# Patient Record
Sex: MALE | Employment: FULL TIME | URBAN - METROPOLITAN AREA
[De-identification: names, ages, dates, MRNs, and addresses within clinical notes are randomized per-mention and may not be internally consistent; named-entity substitution may affect disease eponyms.]

---

## 2018-07-22 ENCOUNTER — HOSPITAL ENCOUNTER (EMERGENCY)
Age: 43
Discharge: HOME | End: 2018-07-22
Attending: EMERGENCY MEDICINE
Payer: SELF-PAY

## 2018-07-22 VITALS
HEIGHT: 75 IN | HEART RATE: 90 BPM | WEIGHT: 290 LBS | RESPIRATION RATE: 18 BRPM | DIASTOLIC BLOOD PRESSURE: 90 MMHG | SYSTOLIC BLOOD PRESSURE: 144 MMHG | BODY MASS INDEX: 36.06 KG/M2 | TEMPERATURE: 98.1 F | OXYGEN SATURATION: 98 %

## 2018-07-22 DIAGNOSIS — K52.9 GASTROENTERITIS: Primary | ICD-10-CM

## 2018-07-22 LAB
ALBUMIN SERPL-MCNC: 3.9 G/DL (ref 3.5–5)
ALBUMIN/GLOB SERPL: 0.9 {RATIO} (ref 1.2–3.5)
ALP SERPL-CCNC: 120 U/L (ref 50–136)
ALT SERPL-CCNC: 28 U/L (ref 12–65)
ANION GAP SERPL CALC-SCNC: 8 MMOL/L (ref 7–16)
AST SERPL-CCNC: 20 U/L (ref 15–37)
BASOPHILS # BLD: 0 K/UL (ref 0–0.2)
BASOPHILS NFR BLD: 0 % (ref 0–2)
BILIRUB SERPL-MCNC: 1.1 MG/DL (ref 0.2–1.1)
BUN SERPL-MCNC: 12 MG/DL (ref 6–23)
CALCIUM SERPL-MCNC: 9.6 MG/DL (ref 8.3–10.4)
CHLORIDE SERPL-SCNC: 103 MMOL/L (ref 98–107)
CO2 SERPL-SCNC: 27 MMOL/L (ref 21–32)
CREAT SERPL-MCNC: 1.18 MG/DL (ref 0.8–1.5)
DIFFERENTIAL METHOD BLD: ABNORMAL
EOSINOPHIL # BLD: 0.1 K/UL (ref 0–0.8)
EOSINOPHIL NFR BLD: 1 % (ref 0.5–7.8)
ERYTHROCYTE [DISTWIDTH] IN BLOOD BY AUTOMATED COUNT: 12.8 % (ref 11.9–14.6)
GLOBULIN SER CALC-MCNC: 4.3 G/DL (ref 2.3–3.5)
GLUCOSE SERPL-MCNC: 184 MG/DL (ref 65–100)
HCT VFR BLD AUTO: 43.2 % (ref 41.1–50.3)
HGB BLD-MCNC: 14.7 G/DL (ref 13.6–17.2)
IMM GRANULOCYTES # BLD: 0 K/UL (ref 0–0.5)
IMM GRANULOCYTES NFR BLD AUTO: 0 % (ref 0–5)
LIPASE SERPL-CCNC: 69 U/L (ref 73–393)
LYMPHOCYTES # BLD: 4.8 K/UL (ref 0.5–4.6)
LYMPHOCYTES NFR BLD: 61 % (ref 13–44)
MCH RBC QN AUTO: 27.9 PG (ref 26.1–32.9)
MCHC RBC AUTO-ENTMCNC: 34 G/DL (ref 31.4–35)
MCV RBC AUTO: 82.1 FL (ref 79.6–97.8)
MONOCYTES # BLD: 0.5 K/UL (ref 0.1–1.3)
MONOCYTES NFR BLD: 6 % (ref 4–12)
NEUTS SEG # BLD: 2.5 K/UL (ref 1.7–8.2)
NEUTS SEG NFR BLD: 32 % (ref 43–78)
PLATELET # BLD AUTO: 240 K/UL (ref 150–450)
PMV BLD AUTO: 10.9 FL (ref 10.8–14.1)
POTASSIUM SERPL-SCNC: 3.7 MMOL/L (ref 3.5–5.1)
PROT SERPL-MCNC: 8.2 G/DL (ref 6.3–8.2)
RBC # BLD AUTO: 5.26 M/UL (ref 4.23–5.67)
SODIUM SERPL-SCNC: 138 MMOL/L (ref 136–145)
WBC # BLD AUTO: 7.8 K/UL (ref 4.3–11.1)

## 2018-07-22 PROCEDURE — 74011250636 HC RX REV CODE- 250/636: Performed by: EMERGENCY MEDICINE

## 2018-07-22 PROCEDURE — 83690 ASSAY OF LIPASE: CPT | Performed by: EMERGENCY MEDICINE

## 2018-07-22 PROCEDURE — 81003 URINALYSIS AUTO W/O SCOPE: CPT | Performed by: EMERGENCY MEDICINE

## 2018-07-22 PROCEDURE — 96361 HYDRATE IV INFUSION ADD-ON: CPT | Performed by: EMERGENCY MEDICINE

## 2018-07-22 PROCEDURE — 99284 EMERGENCY DEPT VISIT MOD MDM: CPT | Performed by: EMERGENCY MEDICINE

## 2018-07-22 PROCEDURE — 96374 THER/PROPH/DIAG INJ IV PUSH: CPT | Performed by: EMERGENCY MEDICINE

## 2018-07-22 PROCEDURE — 85025 COMPLETE CBC W/AUTO DIFF WBC: CPT | Performed by: EMERGENCY MEDICINE

## 2018-07-22 PROCEDURE — 80053 COMPREHEN METABOLIC PANEL: CPT | Performed by: EMERGENCY MEDICINE

## 2018-07-22 RX ORDER — ONDANSETRON 4 MG/1
4 TABLET, ORALLY DISINTEGRATING ORAL
Qty: 11 TAB | Refills: 1 | Status: SHIPPED | OUTPATIENT
Start: 2018-07-22 | End: 2019-09-20

## 2018-07-22 RX ORDER — ONDANSETRON 2 MG/ML
4 INJECTION INTRAMUSCULAR; INTRAVENOUS
Status: COMPLETED | OUTPATIENT
Start: 2018-07-22 | End: 2018-07-22

## 2018-07-22 RX ADMIN — SODIUM CHLORIDE 1000 ML: 900 INJECTION, SOLUTION INTRAVENOUS at 14:33

## 2018-07-22 RX ADMIN — ONDANSETRON 4 MG: 2 INJECTION, SOLUTION INTRAMUSCULAR; INTRAVENOUS at 14:33

## 2018-07-22 NOTE — ED PROVIDER NOTES
HPI Comments: Patient is a 43-year-old male who states he ate some food from a new restaurant 2 days ago. Then yesterday he started with some crampy abdominal pain. It then progressed to nausea, vomiting, and diarrhea. Symptoms were worse today and he felt weak. He denies any fever. He denies any bleeding. Patient is a 40 y.o. male presenting with abdominal pain. The history is provided by the patient. Abdominal Pain    This is a new problem. The current episode started yesterday. The problem occurs constantly. The problem has not changed since onset. The pain is associated with vomiting. The pain is located in the generalized abdominal region. The quality of the pain is cramping. The pain is mild. Associated symptoms include diarrhea, nausea and vomiting. Pertinent negatives include no fever, no melena, no dysuria and no trauma. Nothing worsens the pain. The pain is relieved by nothing. Past workup includes CT scan. Past workup includes no ultrasound. His past medical history does not include gallstones, Crohn's disease, irritable bowel syndrome or pancreatitis. The patient's surgical history includes appendectomy. History reviewed. No pertinent past medical history. History reviewed. No pertinent surgical history. History reviewed. No pertinent family history. Social History     Social History    Marital status:      Spouse name: N/A    Number of children: N/A    Years of education: N/A     Occupational History    Not on file. Social History Main Topics    Smoking status: Not on file    Smokeless tobacco: Not on file    Alcohol use Not on file    Drug use: Not on file    Sexual activity: Not on file     Other Topics Concern    Not on file     Social History Narrative    No narrative on file         ALLERGIES: Tamiflu [oseltamivir] and Tramadol    Review of Systems   Constitutional: Negative for fever. HENT: Negative. Eyes: Negative. Respiratory: Negative. Cardiovascular: Negative. Gastrointestinal: Positive for abdominal pain, diarrhea, nausea and vomiting. Negative for melena. Endocrine: Negative. Genitourinary: Negative for dysuria. Musculoskeletal: Negative. Skin: Negative. Neurological: Negative. Vitals:    07/22/18 1336   BP: (!) 154/104   Pulse: 97   Resp: 18   Temp: 98.1 °F (36.7 °C)   SpO2: 100%   Weight: 131.5 kg (290 lb)   Height: 6' 3\" (1.905 m)            Physical Exam   Constitutional: He appears well-developed and well-nourished. HENT:   Head: Normocephalic and atraumatic. Cardiovascular: Normal rate and regular rhythm. Pulmonary/Chest: Effort normal and breath sounds normal.   Abdominal: Soft. There is no tenderness. There is no rebound and no guarding. Skin: Skin is warm and dry. No rash noted. Nursing note and vitals reviewed. MDM  Number of Diagnoses or Management Options  Diagnosis management comments: Blood work is unremarkable. He is feeling better after the meds and fluids. This likely represents viral gastroenteritis with some possible food poisoning. Amount and/or Complexity of Data Reviewed  Clinical lab tests: ordered and reviewed  Independent visualization of images, tracings, or specimens: yes    Risk of Complications, Morbidity, and/or Mortality  Presenting problems: low  Diagnostic procedures: low  Management options: low    Patient Progress  Patient progress: improved        ED Course     Voice dictation software was used during the making of this note. This software is not perfect and grammatical and other typographical errors may be present. This note has been proofread, but may still contain errors.   Maxine Macias MD; 7/22/2018 @2:56 PM   ===================================================================      Procedures

## 2018-07-22 NOTE — ED NOTES
I have reviewed discharge instructions with the patient. The patient verbalized understanding. Patient left ED via Discharge Method: ambulatory to Home with (self). Opportunity for questions and clarification provided. Patient given 1 scripts. To continue your aftercare when you leave the hospital, you may receive an automated call from our care team to check in on how you are doing. This is a free service and part of our promise to provide the best care and service to meet your aftercare needs.  If you have questions, or wish to unsubscribe from this service please call [phone]. Thank you for Choosing our [facility] Emergency Department.

## 2018-07-22 NOTE — DISCHARGE INSTRUCTIONS
Gastroenteritis: Care Instructions  Your Care Instructions    Gastroenteritis is an illness that may cause nausea, vomiting, and diarrhea. It is sometimes called \"stomach flu. \" It can be caused by bacteria or a virus. You will probably begin to feel better in 1 to 2 days. In the meantime, get plenty of rest and make sure you do not become dehydrated. Dehydration occurs when your body loses too much fluid. Follow-up care is a key part of your treatment and safety. Be sure to make and go to all appointments, and call your doctor if you are having problems. It's also a good idea to know your test results and keep a list of the medicines you take. How can you care for yourself at home? · If your doctor prescribed antibiotics, take them as directed. Do not stop taking them just because you feel better. You need to take the full course of antibiotics. · Drink plenty of fluids to prevent dehydration, enough so that your urine is light yellow or clear like water. Choose water and other caffeine-free clear liquids until you feel better. If you have kidney, heart, or liver disease and have to limit fluids, talk with your doctor before you increase your fluid intake. · Drink fluids slowly, in frequent, small amounts, because drinking too much too fast can cause vomiting. · Begin eating mild foods, such as dry toast, yogurt, applesauce, bananas, and rice. Avoid spicy, hot, or high-fat foods, and do not drink alcohol or caffeine for a day or two. Do not drink milk or eat ice cream until you are feeling better. How to prevent gastroenteritis  · Keep hot foods hot and cold foods cold. · Do not eat meats, dressings, salads, or other foods that have been kept at room temperature for more than 2 hours. · Use a thermometer to check your refrigerator. It should be between 34°F and 40°F.  · Defrost meats in the refrigerator or microwave, not on the kitchen counter. · Keep your hands and your kitchen clean.  Wash your hands, cutting boards, and countertops with hot soapy water frequently. · Cook meat until it is well done. · Do not eat raw eggs or uncooked sauces made with raw eggs. · Do not take chances. If food looks or tastes spoiled, throw it out. When should you call for help? Call 911 anytime you think you may need emergency care. For example, call if:    · You vomit blood or what looks like coffee grounds.     · You passed out (lost consciousness).     · You pass maroon or very bloody stools.    Call your doctor now or seek immediate medical care if:    · You have severe belly pain.     · You have signs of needing more fluids. You have sunken eyes, a dry mouth, and pass only a little dark urine.     · You feel like you are going to faint.     · You have increased belly pain that does not go away in 1 to 2 days.     · You have new or increased nausea, or you are vomiting.     · You have a new or higher fever.     · Your stools are black and tarlike or have streaks of blood.    Watch closely for changes in your health, and be sure to contact your doctor if:    · You are dizzy or lightheaded.     · You urinate less than usual, or your urine is dark yellow or brown.     · You do not feel better with each day that goes by. Where can you learn more? Go to http://charity-galina.info/. Enter N142 in the search box to learn more about \"Gastroenteritis: Care Instructions. \"  Current as of: November 18, 2017  Content Version: 11.7  © 2806-2788 Healthwise, Incorporated. Care instructions adapted under license by [employer] (which disclaims liability or warranty for this information). If you have questions about a medical condition or this instruction, always ask your healthcare professional. [place] [address] any warranty or liability for your use of this information.

## 2018-07-22 NOTE — ED TRIAGE NOTES
Patient reports that his stomach is feeling \"queeesy and light headed. States that his stomach has been cramping and can't keep anything down. Reports diarrhea and vomiting since last night.

## 2018-07-22 NOTE — LETTER
[facility] EMERGENCY DEPT One [address] [id number] [phone] Work/School Note Date: 7/22/2018 thru 7/23/ 2018 To Whom It May concern: 
 
Nichole Hatrosie was seen and treated today in the emergency room by the following provider(s): 
Attending Provider: Trevin Farfan MD. Nichole Ball {Return to school/sport/work:07214} Sincerely, 
 
 
 
 
Jose Seth RN

## 2019-05-18 ENCOUNTER — HOSPITAL ENCOUNTER (EMERGENCY)
Age: 44
Discharge: HOME | End: 2019-05-18
Attending: EMERGENCY MEDICINE
Payer: SELF-PAY

## 2019-05-18 ENCOUNTER — APPOINTMENT (OUTPATIENT)
Dept: GENERAL RADIOLOGY | Age: 44
End: 2019-05-18
Attending: EMERGENCY MEDICINE
Payer: SELF-PAY

## 2019-05-18 VITALS
WEIGHT: 290 LBS | OXYGEN SATURATION: 98 % | RESPIRATION RATE: 18 BRPM | HEART RATE: 96 BPM | TEMPERATURE: 98.4 F | SYSTOLIC BLOOD PRESSURE: 169 MMHG | BODY MASS INDEX: 36.06 KG/M2 | HEIGHT: 75 IN | DIASTOLIC BLOOD PRESSURE: 120 MMHG

## 2019-05-18 DIAGNOSIS — M23.91 INTERNAL DERANGEMENT OF RIGHT KNEE: Primary | ICD-10-CM

## 2019-05-18 DIAGNOSIS — R03.0 ELEVATED BLOOD PRESSURE READING: ICD-10-CM

## 2019-05-18 PROCEDURE — 73562 X-RAY EXAM OF KNEE 3: CPT

## 2019-05-18 PROCEDURE — 99284 EMERGENCY DEPT VISIT MOD MDM: CPT | Performed by: EMERGENCY MEDICINE

## 2019-05-18 PROCEDURE — L1830 KO IMMOB CANVAS LONG PRE OTS: HCPCS | Performed by: EMERGENCY MEDICINE

## 2019-05-18 RX ORDER — NAPROXEN 500 MG/1
500 TABLET ORAL 2 TIMES DAILY WITH MEALS
Qty: 20 TAB | Refills: 0 | Status: SHIPPED | OUTPATIENT
Start: 2019-05-18 | End: 2019-09-20

## 2019-05-18 RX ORDER — HYDROCODONE BITARTRATE AND ACETAMINOPHEN 5; 325 MG/1; MG/1
1-2 TABLET ORAL
Qty: 19 TAB | Refills: 0 | Status: SHIPPED | OUTPATIENT
Start: 2019-05-18 | End: 2019-05-21

## 2019-05-18 RX ORDER — LISINOPRIL 10 MG/1
10 TABLET ORAL DAILY
Qty: 30 TAB | Refills: 0 | Status: SHIPPED | OUTPATIENT
Start: 2019-05-18 | End: 2019-06-17

## 2019-05-18 NOTE — ED PROVIDER NOTES
28-year-old male presents with complaints of right knee pain. Partially 6-7 days ago. The patient struck his knee on the edge of a box spring frame History of 2 prior surgeries, patellar tendon repair and ORIF of patellar fracture No recent injuries. Prior to this episode No fever, vomiting or diarrhea The history is provided by the patient and the spouse. Knee Pain The current episode started more than 2 days ago. The problem occurs constantly. The problem has been gradually worsening. The pain is present in the right knee. The quality of the pain is described as aching and pounding. The pain is moderate. Associated symptoms include limited range of motion and stiffness. Pertinent negatives include no numbness, no back pain and no neck pain. The symptoms are aggravated by contact, movement and palpation. There has been a history of trauma. History reviewed. No pertinent past medical history. History reviewed. No pertinent surgical history. History reviewed. No pertinent family history. Social History Socioeconomic History  Marital status:  Spouse name: Not on file  Number of children: Not on file  Years of education: Not on file  Highest education level: Not on file Occupational History  Not on file Social Needs  Financial resource strain: Not on file  Food insecurity:  
  Worry: Not on file Inability: Not on file  Transportation needs:  
  Medical: Not on file Non-medical: Not on file Tobacco Use  Smoking status: Not on file Substance and Sexual Activity  Alcohol use: Not on file  Drug use: Not on file  Sexual activity: Not on file Lifestyle  Physical activity:  
  Days per week: Not on file Minutes per session: Not on file  Stress: Not on file Relationships  Social connections:  
  Talks on phone: Not on file Gets together: Not on file Attends [employer] service: Not on file Active member of club or organization: Not on file Attends meetings of clubs or organizations: Not on file Relationship status: Not on file  Intimate partner violence:  
  Fear of current or ex partner: Not on file Emotionally abused: Not on file Physically abused: Not on file Forced sexual activity: Not on file Other Topics Concern  Not on file Social History Narrative  Not on file ALLERGIES: Tamiflu [oseltamivir] and Tramadol Review of Systems Constitutional: Negative for activity change, chills, diaphoresis and fever. HENT: Negative for dental problem, hearing loss, nosebleeds, rhinorrhea and sore throat. Eyes: Negative for pain, discharge, redness and visual disturbance. Respiratory: Negative for cough, chest tightness and shortness of breath. Cardiovascular: Negative for chest pain, palpitations and leg swelling. Gastrointestinal: Negative for abdominal pain, constipation, diarrhea, nausea and vomiting. Endocrine: Negative for cold intolerance, heat intolerance, polydipsia and polyuria. Genitourinary: Negative for dysuria and flank pain. Musculoskeletal: Positive for arthralgias and stiffness. Negative for back pain, joint swelling, myalgias and neck pain. Skin: Negative for pallor and rash. Allergic/Immunologic: Negative for environmental allergies and food allergies. Neurological: Negative for dizziness, tremors, light-headedness, numbness and headaches. Hematological: Negative for adenopathy. Does not bruise/bleed easily. Psychiatric/Behavioral: Negative for confusion and dysphoric mood. The patient is not nervous/anxious and is not hyperactive. All other systems reviewed and are negative. Vitals:  
 05/18/19 1243 BP: (!) 168/109 Pulse: 100 Resp: 18 Temp: 98.4 °F (36.9 °C) SpO2: 98% Weight: 131.5 kg (290 lb) Height: 6' 3\" (1.905 m) Physical Exam  
 Constitutional: He is oriented to person, place, and time. He appears well-developed and well-nourished. He appears distressed. HENT:  
Head: Normocephalic and atraumatic. Mouth/Throat: Oropharynx is clear and moist. No oropharyngeal exudate. Eyes: Pupils are equal, round, and reactive to light. Conjunctivae and EOM are normal. No scleral icterus. Neck: Normal range of motion. Neck supple. No JVD present. No thyromegaly present. Cardiovascular: Normal rate, regular rhythm and intact distal pulses. Pulmonary/Chest: Effort normal. No respiratory distress. Abdominal: There is no hepatosplenomegaly. Musculoskeletal: He exhibits no edema or deformity. Right knee: He exhibits decreased range of motion and swelling. He exhibits no effusion, no ecchymosis, no deformity and no laceration. Tenderness found. Patellar tendon tenderness noted. Exam limited by pain. Unable to perform range of motion evaluation Unable to perform ligamentous laxity evaluation Patient is able to perform a straight leg raise. CV increasing pain with this maneuver. Neurological: He is alert and oriented to person, place, and time. No cranial nerve deficit or sensory deficit. He exhibits normal muscle tone. Coordination normal.  
Skin: Skin is warm and dry. Capillary refill takes less than 2 seconds. No rash noted. Psychiatric: He has a normal mood and affect. His behavior is normal. Judgment and thought content normal.  
Nursing note and vitals reviewed. MDM Number of Diagnoses or Management Options Elevated blood pressure reading: established and worsening Internal derangement of right knee: new and requires workup Diagnosis management comments: Patient's controlled substance prescription records reviewed @ the [address] [place] [address] Aware website. Information will be utilized for accurate and appropriate patient care. Amount and/or Complexity of Data Reviewed Tests in the radiology section of CPT®: ordered and reviewed Review and summarize past medical records: yes Risk of Complications, Morbidity, and/or Mortality Presenting problems: moderate Diagnostic procedures: low Management options: moderate General comments: Elements of this note have been dictated via voice recognition software. Text and phrases may be limited by the accuracy of the software. The chart has been reviewed, but errors may still be present. Patient Progress Patient progress: improved Procedures

## 2019-05-18 NOTE — ED TRIAGE NOTES
Right knee pain, works at [employer] and a box spring fell onto knee x3 days ago. Pt states pain has worsened over the past few days. Ambulating with moderate difficulty. Hx of patella tendon rupture repair in affected knee in 2011.

## 2019-05-18 NOTE — LETTER
[facility] [facility] EMERGENCY DEPT One [address] [id number] [phone] Work/School Note Date: 5/18/2019 To Whom It May concern: 
 
Liliana Tirado was seen and treated today in the emergency room by the following provider(s): 
No providers found. Liliana Tirado may return to work on 05/21/19 or before if patient wishes. Sincerely, Nita Begum RN

## 2019-05-18 NOTE — DISCHARGE INSTRUCTIONS
Rest/Ice/Elevate/Compress(splint)  Call your doctor/follow up doctor to set up appointment for recheck visit  Return to ER for any worsening symptoms or new problems which may arise  Do not drink alcohol or drive while taking the prescription pain medications

## 2019-05-18 NOTE — ED NOTES
I have reviewed discharge instructions with the patient. The patient verbalized understanding. Patient left ED via Discharge Method: ambulatory to Home with self. Opportunity for questions and clarification provided. Patient given 3 scripts. No e-sign To continue your aftercare when you leave the hospital, you may receive an automated call from our care team to check in on how you are doing. This is a free service and part of our promise to provide the best care and service to meet your aftercare needs.  If you have questions, or wish to unsubscribe from this service please call [phone]. Thank you for Choosing our [facility] Emergency Department.

## 2019-09-11 ENCOUNTER — APPOINTMENT (OUTPATIENT)
Dept: GENERAL RADIOLOGY | Age: 44
End: 2019-09-11
Attending: EMERGENCY MEDICINE
Payer: COMMERCIAL

## 2019-09-11 ENCOUNTER — HOSPITAL ENCOUNTER (EMERGENCY)
Age: 44
Discharge: HOME | End: 2019-09-11
Attending: EMERGENCY MEDICINE
Payer: COMMERCIAL

## 2019-09-11 VITALS
HEART RATE: 90 BPM | SYSTOLIC BLOOD PRESSURE: 134 MMHG | OXYGEN SATURATION: 99 % | TEMPERATURE: 98 F | DIASTOLIC BLOOD PRESSURE: 80 MMHG | RESPIRATION RATE: 18 BRPM

## 2019-09-11 DIAGNOSIS — R73.9 HYPERGLYCEMIA: ICD-10-CM

## 2019-09-11 DIAGNOSIS — R07.9 CHEST PAIN, UNSPECIFIED TYPE: Primary | ICD-10-CM

## 2019-09-11 LAB
ALBUMIN SERPL-MCNC: 3.8 G/DL (ref 3.5–5)
ALBUMIN/GLOB SERPL: 1 {RATIO} (ref 1.2–3.5)
ALP SERPL-CCNC: 124 U/L (ref 50–136)
ALT SERPL-CCNC: 29 U/L (ref 12–65)
ANION GAP SERPL CALC-SCNC: 8 MMOL/L (ref 7–16)
AST SERPL-CCNC: 22 U/L (ref 15–37)
ATRIAL RATE: 104 BPM
BASOPHILS # BLD: 0 K/UL (ref 0–0.2)
BASOPHILS NFR BLD: 1 % (ref 0–2)
BILIRUB SERPL-MCNC: 1 MG/DL (ref 0.2–1.1)
BUN SERPL-MCNC: 22 MG/DL (ref 6–23)
CALCIUM SERPL-MCNC: 9.3 MG/DL (ref 8.3–10.4)
CALCULATED P AXIS, ECG09: 42 DEGREES
CALCULATED R AXIS, ECG10: 13 DEGREES
CALCULATED T AXIS, ECG11: 33 DEGREES
CHLORIDE SERPL-SCNC: 103 MMOL/L (ref 98–107)
CO2 SERPL-SCNC: 25 MMOL/L (ref 21–32)
CREAT SERPL-MCNC: 1.41 MG/DL (ref 0.8–1.5)
DIAGNOSIS, 93000: NORMAL
DIFFERENTIAL METHOD BLD: ABNORMAL
EOSINOPHIL # BLD: 0.1 K/UL (ref 0–0.8)
EOSINOPHIL NFR BLD: 2 % (ref 0.5–7.8)
ERYTHROCYTE [DISTWIDTH] IN BLOOD BY AUTOMATED COUNT: 13.3 % (ref 11.9–14.6)
GLOBULIN SER CALC-MCNC: 3.9 G/DL (ref 2.3–3.5)
GLUCOSE BLD STRIP.AUTO-MCNC: 191 MG/DL (ref 65–100)
GLUCOSE BLD STRIP.AUTO-MCNC: 221 MG/DL (ref 65–100)
GLUCOSE SERPL-MCNC: 306 MG/DL (ref 65–100)
HCT VFR BLD AUTO: 40.5 % (ref 41.1–50.3)
HGB BLD-MCNC: 14 G/DL (ref 13.6–17.2)
IMM GRANULOCYTES # BLD AUTO: 0 K/UL (ref 0–0.5)
IMM GRANULOCYTES NFR BLD AUTO: 1 % (ref 0–5)
LYMPHOCYTES # BLD: 3.6 K/UL (ref 0.5–4.6)
LYMPHOCYTES NFR BLD: 54 % (ref 13–44)
MCH RBC QN AUTO: 28.6 PG (ref 26.1–32.9)
MCHC RBC AUTO-ENTMCNC: 34.6 G/DL (ref 31.4–35)
MCV RBC AUTO: 82.8 FL (ref 79.6–97.8)
MONOCYTES # BLD: 0.5 K/UL (ref 0.1–1.3)
MONOCYTES NFR BLD: 7 % (ref 4–12)
NEUTS SEG # BLD: 2.4 K/UL (ref 1.7–8.2)
NEUTS SEG NFR BLD: 36 % (ref 43–78)
NRBC # BLD: 0 K/UL (ref 0–0.2)
P-R INTERVAL, ECG05: 148 MS
PLATELET # BLD AUTO: 227 K/UL (ref 150–450)
PMV BLD AUTO: 11.1 FL (ref 9.4–12.3)
POTASSIUM SERPL-SCNC: 4.2 MMOL/L (ref 3.5–5.1)
PROT SERPL-MCNC: 7.7 G/DL (ref 6.3–8.2)
Q-T INTERVAL, ECG07: 336 MS
QRS DURATION, ECG06: 84 MS
QTC CALCULATION (BEZET), ECG08: 441 MS
RBC # BLD AUTO: 4.89 M/UL (ref 4.23–5.6)
SODIUM SERPL-SCNC: 136 MMOL/L (ref 136–145)
TROPONIN I BLD-MCNC: 0.01 NG/ML (ref 0.02–0.05)
TROPONIN I SERPL-MCNC: <0.02 NG/ML (ref 0.02–0.05)
VENTRICULAR RATE, ECG03: 104 BPM
WBC # BLD AUTO: 6.7 K/UL (ref 4.3–11.1)

## 2019-09-11 PROCEDURE — 93005 ELECTROCARDIOGRAM TRACING: CPT | Performed by: EMERGENCY MEDICINE

## 2019-09-11 PROCEDURE — 96360 HYDRATION IV INFUSION INIT: CPT | Performed by: EMERGENCY MEDICINE

## 2019-09-11 PROCEDURE — 74011250636 HC RX REV CODE- 250/636: Performed by: EMERGENCY MEDICINE

## 2019-09-11 PROCEDURE — 85025 COMPLETE CBC W/AUTO DIFF WBC: CPT

## 2019-09-11 PROCEDURE — 82962 GLUCOSE BLOOD TEST: CPT

## 2019-09-11 PROCEDURE — 84484 ASSAY OF TROPONIN QUANT: CPT

## 2019-09-11 PROCEDURE — 74011000250 HC RX REV CODE- 250: Performed by: EMERGENCY MEDICINE

## 2019-09-11 PROCEDURE — 99285 EMERGENCY DEPT VISIT HI MDM: CPT | Performed by: EMERGENCY MEDICINE

## 2019-09-11 PROCEDURE — 71046 X-RAY EXAM CHEST 2 VIEWS: CPT

## 2019-09-11 PROCEDURE — 74011250637 HC RX REV CODE- 250/637: Performed by: EMERGENCY MEDICINE

## 2019-09-11 PROCEDURE — 80053 COMPREHEN METABOLIC PANEL: CPT

## 2019-09-11 RX ORDER — LIDOCAINE HYDROCHLORIDE 20 MG/ML
15 SOLUTION OROPHARYNGEAL
Status: COMPLETED | OUTPATIENT
Start: 2019-09-11 | End: 2019-09-11

## 2019-09-11 RX ORDER — MAG HYDROX/ALUMINUM HYD/SIMETH 200-200-20
30 SUSPENSION, ORAL (FINAL DOSE FORM) ORAL
Status: COMPLETED | OUTPATIENT
Start: 2019-09-11 | End: 2019-09-11

## 2019-09-11 RX ORDER — KETOROLAC TROMETHAMINE 30 MG/ML
30 INJECTION, SOLUTION INTRAMUSCULAR; INTRAVENOUS
Status: DISCONTINUED | OUTPATIENT
Start: 2019-09-11 | End: 2019-09-11 | Stop reason: HOSPADM

## 2019-09-11 RX ADMIN — SODIUM CHLORIDE 1000 ML: 900 INJECTION, SOLUTION INTRAVENOUS at 16:31

## 2019-09-11 RX ADMIN — LIDOCAINE HYDROCHLORIDE 15 ML: 20 SOLUTION ORAL; TOPICAL at 16:31

## 2019-09-11 RX ADMIN — ALUMINUM HYDROXIDE, MAGNESIUM HYDROXIDE, AND SIMETHICONE 30 ML: 200; 200; 20 SUSPENSION ORAL at 16:31

## 2019-09-11 NOTE — LETTER
[address] [facility] EMERGENCY DEPT 
[address] [facility] 
[phone] Work/School Note Date: 9/11/2019 To Whom It May concern: 
 
Jamar Ortega was seen and treated today in the emergency room by the following provider(s): 
Attending Provider: Trena Saavedra MD. Jamar Ortega may return to work on 9/15/19.  
 
Sincerely, 
 
 
 
 
Ramin Recinos MD

## 2019-09-11 NOTE — DISCHARGE INSTRUCTIONS
Patient Education        Chest Pain: Care Instructions  Your Care Instructions    There are many things that can cause chest pain. Some are not serious and will get better on their own in a few days. But some kinds of chest pain need more testing and treatment. Your doctor may have recommended a follow-up visit in the next 8 to 12 hours. If you are not getting better, you may need more tests or treatment. Even though your doctor has released you, you still need to watch for any problems. The doctor carefully checked you, but sometimes problems can develop later. If you have new symptoms or if your symptoms do not get better, get medical care right away. If you have worse or different chest pain or pressure that lasts more than 5 minutes or you passed out (lost consciousness), call 911 or seek other emergency help right away. A medical visit is only one step in your treatment. Even if you feel better, you still need to do what your doctor recommends, such as going to all suggested follow-up appointments and taking medicines exactly as directed. This will help you recover and help prevent future problems. How can you care for yourself at home? · Rest until you feel better. · Take your medicine exactly as prescribed. Call your doctor if you think you are having a problem with your medicine. · Do not drive after taking a prescription pain medicine. When should you call for help? Call 911 if:    · You passed out (lost consciousness).     · You have severe difficulty breathing.     · You have symptoms of a heart attack. These may include:  ? Chest pain or pressure, or a strange feeling in your chest.  ? Sweating. ? Shortness of breath. ? Nausea or vomiting. ? Pain, pressure, or a strange feeling in your back, neck, jaw, or upper belly or in one or both shoulders or arms. ? Lightheadedness or sudden weakness. ? A fast or irregular heartbeat.   After you call 911, the  may tell you to chew 1 adult-strength or 2 to 4 low-dose aspirin. Wait for an ambulance. Do not try to drive yourself.    Call your doctor today if:    · You have any trouble breathing.     · Your chest pain gets worse.     · You are dizzy or lightheaded, or you feel like you may faint.     · You are not getting better as expected.     · You are having new or different chest pain. Where can you learn more? Go to http://charity-galina.info/. Enter A120 in the search box to learn more about \"Chest Pain: Care Instructions. \"  Current as of: September 23, 2018  Content Version: 12.1  © 0509-1583 [employer]. Care instructions adapted under license by [employer] (which disclaims liability or warranty for this information). If you have questions about a medical condition or this instruction, always ask your healthcare professional. [place] [address] any warranty or liability for your use of this information. Patient Education        Learning About High Blood Sugar  What is high blood sugar? Your body turns the food you eat into glucose (sugar), which it uses for energy. But if your body isn't able to use the sugar right away, it can build up in your blood and lead to high blood sugar. When the amount of sugar in your blood stays too high for too much of the time, you may have diabetes. Diabetes is a disease that can cause serious health problems. The good news is that lifestyle changes may help you get your blood sugar back to normal and avoid or delay diabetes. What causes high blood sugar? Sugar (glucose) can build up in your blood if you:  · Are overweight. · Have a family history of diabetes. · Take certain medicines, such as steroids. What are the symptoms? Having high blood sugar may not cause any symptoms at all. Or it may make you feel very thirsty or very hungry.  You may also urinate more often than usual, have blurry vision, or lose weight without trying. How is high blood sugar treated? You can take steps to lower your blood sugar level if you understand what makes it get higher. Your doctor may want you to learn how to test your blood sugar level at home. Then you can see how illness, stress, or different kinds of food or medicine raise or lower your blood sugar level. Other tests may be needed to see if you have diabetes. How can you prevent high blood sugar? · Watch your weight. If you're overweight, losing just a small amount of weight may help. Reducing fat around your waist is most important. · Limit the amount of calories, sweets, and unhealthy fat you eat. Ask your doctor if a dietitian can help you. A registered dietitian can help you create meal plans that fit your lifestyle. · Get at least 30 minutes of exercise on most days of the week. Exercise helps control your blood sugar. It also helps you maintain a healthy weight. Walking is a good choice. You also may want to do other activities, such as running, swimming, cycling, or playing tennis or team sports. · If your doctor prescribed medicines, take them exactly as prescribed. Call your doctor if you think you are having a problem with your medicine. You will get more details on the specific medicines your doctor prescribes. Follow-up care is a key part of your treatment and safety. Be sure to make and go to all appointments, and call your doctor if you are having problems. It's also a good idea to know your test results and keep a list of the medicines you take. Where can you learn more? Go to http://charity-galina.info/. Enter O108 in the search box to learn more about \"Learning About High Blood Sugar. \"  Current as of: July 25, 2018  Content Version: 12.1  © 2081-7710 Healthwise, Incorporated. Care instructions adapted under license by [employer] (which disclaims liability or warranty for this information).  If you have questions about a medical condition or this instruction, always ask your healthcare professional. [place] [address] any warranty or liability for your use of this information.

## 2019-09-11 NOTE — ED NOTES
I have reviewed discharge instructions with the patient. The patient verbalized understanding. Patient left ED via Discharge Method: ambulatory to Home with family. Opportunity for questions and clarification provided. Patient given 0 scripts. To continue your aftercare when you leave the hospital, you may receive an automated call from our care team to check in on how you are doing. This is a free service and part of our promise to provide the best care and service to meet your aftercare needs.  If you have questions, or wish to unsubscribe from this service please call [phone]. Thank you for Choosing our [facility] Emergency Department.

## 2019-09-11 NOTE — ED PROVIDER NOTES
45-year-old -American male denies medical history presents with chest pain which began around 1:00 today after eating lunch. He states it is a squeezing pain in the center of his chest.  It does not radiate to his neck back arms or abdomen. He did have one episode of vomiting after the pain began. States the pain then worsened. He did not see any blood in his emesis. No aggravating or alleviating factors although the pain seems to improve when lying down. The history is provided by the patient. Chest Pain    Associated symptoms include nausea and vomiting. Pertinent negatives include no abdominal pain, no back pain, no cough, no fever, no headaches and no shortness of breath. History reviewed. No pertinent past medical history. History reviewed. No pertinent surgical history. History reviewed. No pertinent family history.     Social History     Socioeconomic History    Marital status:      Spouse name: Not on file    Number of children: Not on file    Years of education: Not on file    Highest education level: Not on file   Occupational History    Not on file   Social Needs    Financial resource strain: Not on file    Food insecurity:     Worry: Not on file     Inability: Not on file    Transportation needs:     Medical: Not on file     Non-medical: Not on file   Tobacco Use    Smoking status: Not on file   Substance and Sexual Activity    Alcohol use: Not on file    Drug use: Not on file    Sexual activity: Not on file   Lifestyle    Physical activity:     Days per week: Not on file     Minutes per session: Not on file    Stress: Not on file   Relationships    Social connections:     Talks on phone: Not on file     Gets together: Not on file     Attends Yazidi service: Not on file     Active member of club or organization: Not on file     Attends meetings of clubs or organizations: Not on file     Relationship status: Not on file    Intimate partner violence:     Fear of current or ex partner: Not on file     Emotionally abused: Not on file     Physically abused: Not on file     Forced sexual activity: Not on file   Other Topics Concern    Not on file   Social History Narrative    Not on file         ALLERGIES: Tamiflu [oseltamivir] and Tramadol    Review of Systems   Constitutional: Negative for fever. HENT: Negative for congestion. Respiratory: Negative for cough and shortness of breath. Cardiovascular: Positive for chest pain. Gastrointestinal: Positive for nausea and vomiting. Negative for abdominal pain. Genitourinary: Negative for dysuria. Musculoskeletal: Negative for back pain and neck pain. Skin: Negative for rash. Neurological: Negative for headaches. Vitals:    09/11/19 1436   BP: 127/85   Pulse: (!) 107   Resp: 18   Temp: 98.9 °F (37.2 °C)   SpO2: 96%            Physical Exam   Constitutional: He is oriented to person, place, and time. He appears well-developed and well-nourished. No distress. HENT:   Head: Normocephalic and atraumatic. Mouth/Throat: Oropharynx is clear and moist.   Eyes: Pupils are equal, round, and reactive to light. Conjunctivae are normal.   Neck: Normal range of motion. Neck supple. Cardiovascular: Normal rate and regular rhythm. Pulmonary/Chest: Effort normal and breath sounds normal. He exhibits tenderness. Patient does wince with palpation of his sternum   Abdominal: Soft. He exhibits no distension. There is no tenderness. There is no guarding. Musculoskeletal: Normal range of motion. He exhibits no edema. Neurological: He is alert and oriented to person, place, and time. Skin: Skin is warm and dry. Psychiatric: He has a normal mood and affect. His behavior is normal.   Nursing note and vitals reviewed. MDM  Number of Diagnoses or Management Options  Diagnosis management comments: EKG sinus tachycardia at 104 without diagnostic ST changes. Chest x-ray no acute abnormality. Lab work unremarkable except for hyperglycemia around 300 without DKA. Patient is being treated with IV fluids. Also received Maalox and viscous lidocaine for possible esophageal pain. Repeat troponin also 0. Patient's glucose has improved without insulin. Will give referral to cardiology for further work-up of chest pain. We will also give primary care referral for possible diabetes.        Amount and/or Complexity of Data Reviewed  Clinical lab tests: ordered and reviewed  Tests in the radiology section of CPT®: ordered and reviewed  Tests in the medicine section of CPT®: ordered and reviewed  Independent visualization of images, tracings, or specimens: yes    Risk of Complications, Morbidity, and/or Mortality  Presenting problems: moderate  Diagnostic procedures: moderate  Management options: moderate           Procedures

## 2019-09-11 NOTE — ED TRIAGE NOTES
Pt was finishing lunch when he began having chest pain. Felt like he needed to go to bathroom when he vomited. Pt is not a diabetic but bgl is 326. No hx of heart burn. No radiation of pain. nsr on monitor.   bp 140/90 - 150/90

## 2019-09-17 ENCOUNTER — APPOINTMENT (OUTPATIENT)
Dept: GENERAL RADIOLOGY | Age: 44
End: 2019-09-17
Attending: EMERGENCY MEDICINE
Payer: COMMERCIAL

## 2019-09-17 ENCOUNTER — HOSPITAL ENCOUNTER (EMERGENCY)
Age: 44
Discharge: HOME | End: 2019-09-17
Attending: EMERGENCY MEDICINE
Payer: COMMERCIAL

## 2019-09-17 VITALS
DIASTOLIC BLOOD PRESSURE: 114 MMHG | WEIGHT: 290 LBS | TEMPERATURE: 98.5 F | HEIGHT: 75 IN | BODY MASS INDEX: 36.06 KG/M2 | SYSTOLIC BLOOD PRESSURE: 171 MMHG | OXYGEN SATURATION: 95 % | HEART RATE: 83 BPM | RESPIRATION RATE: 12 BRPM

## 2019-09-17 DIAGNOSIS — R07.89 ATYPICAL CHEST PAIN: Primary | ICD-10-CM

## 2019-09-17 LAB
ALBUMIN SERPL-MCNC: 3.6 G/DL (ref 3.5–5)
ALBUMIN/GLOB SERPL: 0.8 {RATIO} (ref 1.2–3.5)
ALP SERPL-CCNC: 129 U/L (ref 50–136)
ALT SERPL-CCNC: 34 U/L (ref 12–65)
ANION GAP SERPL CALC-SCNC: 6 MMOL/L (ref 7–16)
AST SERPL-CCNC: 20 U/L (ref 15–37)
BASOPHILS # BLD: 0.1 K/UL (ref 0–0.2)
BASOPHILS NFR BLD: 1 % (ref 0–2)
BILIRUB SERPL-MCNC: 0.6 MG/DL (ref 0.2–1.1)
BUN SERPL-MCNC: 11 MG/DL (ref 6–23)
CALCIUM SERPL-MCNC: 9.2 MG/DL (ref 8.3–10.4)
CHLORIDE SERPL-SCNC: 104 MMOL/L (ref 98–107)
CO2 SERPL-SCNC: 27 MMOL/L (ref 21–32)
CREAT SERPL-MCNC: 1.4 MG/DL (ref 0.8–1.5)
D DIMER PPP FEU-MCNC: 0.43 UG/ML(FEU)
DIFFERENTIAL METHOD BLD: ABNORMAL
EOSINOPHIL # BLD: 0.2 K/UL (ref 0–0.8)
EOSINOPHIL NFR BLD: 2 % (ref 0.5–7.8)
ERYTHROCYTE [DISTWIDTH] IN BLOOD BY AUTOMATED COUNT: 13.3 % (ref 11.9–14.6)
GLOBULIN SER CALC-MCNC: 4.3 G/DL (ref 2.3–3.5)
GLUCOSE SERPL-MCNC: 368 MG/DL (ref 65–100)
HCT VFR BLD AUTO: 42.6 % (ref 41.1–50.3)
HGB BLD-MCNC: 14.3 G/DL (ref 13.6–17.2)
IMM GRANULOCYTES # BLD AUTO: 0.1 K/UL (ref 0–0.5)
IMM GRANULOCYTES NFR BLD AUTO: 1 % (ref 0–5)
LIPASE SERPL-CCNC: 138 U/L (ref 73–393)
LYMPHOCYTES # BLD: 5 K/UL (ref 0.5–4.6)
LYMPHOCYTES NFR BLD: 56 % (ref 13–44)
MCH RBC QN AUTO: 28.2 PG (ref 26.1–32.9)
MCHC RBC AUTO-ENTMCNC: 33.6 G/DL (ref 31.4–35)
MCV RBC AUTO: 84 FL (ref 79.6–97.8)
MONOCYTES # BLD: 0.5 K/UL (ref 0.1–1.3)
MONOCYTES NFR BLD: 5 % (ref 4–12)
NEUTS SEG # BLD: 3.1 K/UL (ref 1.7–8.2)
NEUTS SEG NFR BLD: 35 % (ref 43–78)
NRBC # BLD: 0 K/UL (ref 0–0.2)
PLATELET # BLD AUTO: 238 K/UL (ref 150–450)
PMV BLD AUTO: 10.8 FL (ref 9.4–12.3)
POTASSIUM SERPL-SCNC: 4.1 MMOL/L (ref 3.5–5.1)
PROT SERPL-MCNC: 7.9 G/DL (ref 6.3–8.2)
RBC # BLD AUTO: 5.07 M/UL (ref 4.23–5.6)
SODIUM SERPL-SCNC: 137 MMOL/L (ref 136–145)
TROPONIN I BLD-MCNC: 0.01 NG/ML (ref 0.02–0.05)
TROPONIN I SERPL-MCNC: <0.02 NG/ML (ref 0.02–0.05)
WBC # BLD AUTO: 8.8 K/UL (ref 4.3–11.1)

## 2019-09-17 PROCEDURE — 99285 EMERGENCY DEPT VISIT HI MDM: CPT | Performed by: EMERGENCY MEDICINE

## 2019-09-17 PROCEDURE — 74011250637 HC RX REV CODE- 250/637: Performed by: EMERGENCY MEDICINE

## 2019-09-17 PROCEDURE — 96374 THER/PROPH/DIAG INJ IV PUSH: CPT | Performed by: EMERGENCY MEDICINE

## 2019-09-17 PROCEDURE — 96375 TX/PRO/DX INJ NEW DRUG ADDON: CPT | Performed by: EMERGENCY MEDICINE

## 2019-09-17 PROCEDURE — 85025 COMPLETE CBC W/AUTO DIFF WBC: CPT

## 2019-09-17 PROCEDURE — 84484 ASSAY OF TROPONIN QUANT: CPT

## 2019-09-17 PROCEDURE — 80053 COMPREHEN METABOLIC PANEL: CPT

## 2019-09-17 PROCEDURE — 74011250636 HC RX REV CODE- 250/636

## 2019-09-17 PROCEDURE — 83690 ASSAY OF LIPASE: CPT

## 2019-09-17 PROCEDURE — 96361 HYDRATE IV INFUSION ADD-ON: CPT | Performed by: EMERGENCY MEDICINE

## 2019-09-17 PROCEDURE — 93005 ELECTROCARDIOGRAM TRACING: CPT | Performed by: EMERGENCY MEDICINE

## 2019-09-17 PROCEDURE — 85379 FIBRIN DEGRADATION QUANT: CPT

## 2019-09-17 PROCEDURE — 96376 TX/PRO/DX INJ SAME DRUG ADON: CPT | Performed by: EMERGENCY MEDICINE

## 2019-09-17 PROCEDURE — 74011250636 HC RX REV CODE- 250/636: Performed by: EMERGENCY MEDICINE

## 2019-09-17 PROCEDURE — 71046 X-RAY EXAM CHEST 2 VIEWS: CPT

## 2019-09-17 RX ORDER — MORPHINE SULFATE 2 MG/ML
2 INJECTION, SOLUTION INTRAMUSCULAR; INTRAVENOUS
Status: COMPLETED | OUTPATIENT
Start: 2019-09-17 | End: 2019-09-17

## 2019-09-17 RX ORDER — METFORMIN HYDROCHLORIDE 500 MG/1
500 TABLET ORAL
Qty: 30 TAB | Refills: 3 | Status: SHIPPED | OUTPATIENT
Start: 2019-09-17 | End: 2019-10-16 | Stop reason: ALTCHOICE

## 2019-09-17 RX ORDER — SUCRALFATE 1 G/1
1 TABLET ORAL 3 TIMES DAILY
Qty: 42 TAB | Refills: 0 | Status: SHIPPED | OUTPATIENT
Start: 2019-09-17 | End: 2019-10-01

## 2019-09-17 RX ORDER — ONDANSETRON 2 MG/ML
4 INJECTION INTRAMUSCULAR; INTRAVENOUS
Status: COMPLETED | OUTPATIENT
Start: 2019-09-17 | End: 2019-09-17

## 2019-09-17 RX ORDER — MORPHINE SULFATE 2 MG/ML
INJECTION, SOLUTION INTRAMUSCULAR; INTRAVENOUS
Status: COMPLETED
Start: 2019-09-17 | End: 2019-09-17

## 2019-09-17 RX ORDER — MORPHINE SULFATE 4 MG/ML
4 INJECTION INTRAVENOUS
Status: COMPLETED | OUTPATIENT
Start: 2019-09-17 | End: 2019-09-17

## 2019-09-17 RX ORDER — SUCRALFATE 1 G/1
1 TABLET ORAL ONCE
Status: COMPLETED | OUTPATIENT
Start: 2019-09-17 | End: 2019-09-17

## 2019-09-17 RX ADMIN — ONDANSETRON 4 MG: 2 INJECTION INTRAMUSCULAR; INTRAVENOUS at 15:27

## 2019-09-17 RX ADMIN — SUCRALFATE 1 G: 1 TABLET ORAL at 16:51

## 2019-09-17 RX ADMIN — MORPHINE SULFATE 2 MG: 2 INJECTION, SOLUTION INTRAMUSCULAR; INTRAVENOUS at 16:53

## 2019-09-17 RX ADMIN — MORPHINE SULFATE 4 MG: 4 INJECTION INTRAVENOUS at 15:27

## 2019-09-17 RX ADMIN — SODIUM CHLORIDE 1000 ML: 900 INJECTION, SOLUTION INTRAVENOUS at 15:28

## 2019-09-17 RX ADMIN — MORPHINE SULFATE 2 MG: 4 INJECTION, SOLUTION INTRAMUSCULAR; INTRAVENOUS at 16:53

## 2019-09-17 NOTE — DISCHARGE INSTRUCTIONS
As we discussed, we did not find the exact cause of your symptoms tonight in the emergency department. Therefore, it is important for you to continue with your plan to follow-up with your cardiologist.  Nadia Booker should also try to follow-up with your primary care doctor for further evaluation and management of your new onset diabetes. Please return to the emergency department with any fevers, vomiting, difficulty breathing, worsening symptoms, or additional concerns.

## 2019-09-17 NOTE — ED TRIAGE NOTES
Reports was seen last week for chest tightness. Pradeep has appointment with cards on Friday. Today having chest pain going into the back and SOB.

## 2019-09-17 NOTE — ED NOTES
I have reviewed discharge instructions with the patient. The patient verbalized understanding. Patient left ED via Discharge Method: ambulatory to Home with spouse. Opportunity for questions and clarification provided. Patient given 2 scripts. To continue your aftercare when you leave the hospital, you may receive an automated call from our care team to check in on how you are doing. This is a free service and part of our promise to provide the best care and service to meet your aftercare needs.  If you have questions, or wish to unsubscribe from this service please call [phone]. Thank you for Choosing our [facility] Emergency Department.

## 2019-09-17 NOTE — LETTER
NOTIFICATION RETURN TO WORK / SCHOOL 
 
9/17/2019 7:22 PM 
 
Mr. Qamar Barragan [address] [address] To Whom It May Concern: 
 
Qamar Barragan is currently under the care of [facility] EMERGENCY DEPT. He will return to work/school on: 9/23 Qamar Barragan may return to work/school with the following restrictions: n/a. If there are questions or concerns please have the patient contact our office.  
 
 
 
Sincerely, 
 
 
Jake [place] RN

## 2019-09-17 NOTE — ED PROVIDER NOTES
50-year-old gentleman presents with concerns about pain in his chest that radiates into his back. He said this is been present for about a week. Patient said that he was seen here about last Wednesday and had a negative evaluation except for an elevated blood sugar. Patient says that his symptoms have continued and have not really gotten any better. He denies any known medical problems including no history of hypertension and no known history of diabetes. He says those things do run in his family. Patient says that with his pain he has had some mild shortness of breath. Arlette Large he has not had any coughing is had no fevers or chills. Said nothing seems to make it any better or worse. He has tried some over-the-counter medicines without any relief. Elements of this note were created using speech recognition software. As such, errors of speech recognition may be present. No past medical history on file. No past surgical history on file. No family history on file.     Social History     Socioeconomic History    Marital status:      Spouse name: Not on file    Number of children: Not on file    Years of education: Not on file    Highest education level: Not on file   Occupational History    Not on file   Social Needs    Financial resource strain: Not on file    Food insecurity:     Worry: Not on file     Inability: Not on file    Transportation needs:     Medical: Not on file     Non-medical: Not on file   Tobacco Use    Smoking status: Not on file   Substance and Sexual Activity    Alcohol use: Not on file    Drug use: Not on file    Sexual activity: Not on file   Lifestyle    Physical activity:     Days per week: Not on file     Minutes per session: Not on file    Stress: Not on file   Relationships    Social connections:     Talks on phone: Not on file     Gets together: Not on file     Attends Anabaptist service: Not on file     Active member of club or organization: Not on file     Attends meetings of clubs or organizations: Not on file     Relationship status: Not on file    Intimate partner violence:     Fear of current or ex partner: Not on file     Emotionally abused: Not on file     Physically abused: Not on file     Forced sexual activity: Not on file   Other Topics Concern    Not on file   Social History Narrative    Not on file         ALLERGIES: Tamiflu [oseltamivir] and Tramadol    Review of Systems   Constitutional: Negative for chills, diaphoresis and fever. HENT: Negative for congestion, rhinorrhea and sore throat. Eyes: Negative for redness and visual disturbance. Respiratory: Positive for chest tightness and shortness of breath. Negative for cough and wheezing. Cardiovascular: Positive for chest pain. Negative for palpitations. Gastrointestinal: Positive for nausea. Negative for abdominal pain, blood in stool, diarrhea and vomiting. Endocrine: Negative for polydipsia and polyuria. Genitourinary: Negative for dysuria and hematuria. Musculoskeletal: Positive for back pain. Negative for arthralgias, myalgias and neck stiffness. Skin: Negative for rash. Allergic/Immunologic: Negative for environmental allergies and food allergies. Neurological: Negative for dizziness, weakness and headaches. Hematological: Negative for adenopathy. Does not bruise/bleed easily. Psychiatric/Behavioral: Negative for confusion and sleep disturbance. The patient is not nervous/anxious. Vitals:    09/17/19 1312   BP: (!) 165/96   Pulse: 98   Resp: 18   Temp: 98.5 °F (36.9 °C)   SpO2: 98%   Weight: 131.5 kg (290 lb)   Height: 6' 3\" (1.905 m)            Physical Exam   Constitutional: He is oriented to person, place, and time. He appears well-developed and well-nourished. HENT:   Head: Normocephalic and atraumatic. Eyes: Pupils are equal, round, and reactive to light. Conjunctivae and EOM are normal.   Neck: Normal range of motion. Cardiovascular: Normal rate and regular rhythm. Pulmonary/Chest: Effort normal and breath sounds normal. No respiratory distress. He has no wheezes. He has no rales. He exhibits tenderness. Patient has some tenderness along his left lower sternal border and over his xiphoid process. Abdominal: Soft. Bowel sounds are normal. There is no rebound and no guarding. No right upper quadrant tenderness and no tenderness in his epigastric region. Musculoskeletal: Normal range of motion. He exhibits no edema or tenderness. Lymphadenopathy:     He has no cervical adenopathy. Neurological: He is alert and oriented to person, place, and time. Skin: Skin is warm and dry. Psychiatric: He has a normal mood and affect. Nursing note and vitals reviewed. MDM  Number of Diagnoses or Management Options  Diagnosis management comments: Given his persistent symptoms I will add a lipase and a d-dimer. Chest x-ray and initial troponin are negative. His blood work is consistent with new onset diabetes. Patient's 2 sets of troponins are negative. His d-dimer and lipase are negative. Is monitoring has shown no evidence for ischemic changes. He is tender to palpation so maybe he has some costochondritis. Given his elevated blood sugar I will discharge him home with a prescription for metformin. I will also give him some Carafate in light case there is a GI component to his symptoms. He has an appointment this coming Friday with cardiology. Therefore, I will discharge him home.          Procedures

## 2019-09-18 LAB
ATRIAL RATE: 101 BPM
CALCULATED P AXIS, ECG09: 48 DEGREES
CALCULATED R AXIS, ECG10: 35 DEGREES
CALCULATED T AXIS, ECG11: 17 DEGREES
DIAGNOSIS, 93000: NORMAL
P-R INTERVAL, ECG05: 160 MS
Q-T INTERVAL, ECG07: 334 MS
QRS DURATION, ECG06: 94 MS
QTC CALCULATION (BEZET), ECG08: 433 MS
VENTRICULAR RATE, ECG03: 101 BPM

## 2020-01-28 PROBLEM — M10.49 OTHER SECONDARY GOUT, MULTIPLE SITES: Status: ACTIVE | Noted: 2020-01-28

## 2020-02-10 ENCOUNTER — PATIENT OUTREACH (OUTPATIENT)
Dept: OTHER | Age: 45
End: 2020-02-10

## 2020-02-10 NOTE — PROGRESS NOTES
Patient on report as eligible for Case Management. Left discreet message on voicemail with this CM contact information. Will attempt to contact again to offer [phone] Management services.

## 2020-02-19 ENCOUNTER — PATIENT OUTREACH (OUTPATIENT)
Dept: OTHER | Age: 45
End: 2020-02-19

## 2020-02-19 NOTE — LETTER
Mr. Alyson Perkins [address] [address] Dear Mr. Ghazal Dunlap, My name is Elizabeth Troncoso RN, Employee Care Manager for [employer] and I have been trying to reach you. The Employee Care Management [facility]) program is a free-of-charge confidential service provided to our employees and their family members covered by the [facility]. The program will provide an employee and his/her family with the [employer] expertise to assist in navigating the health care delivery system, provider services, and their overall care needsso as to assure and improve health care interactions and enhance the quality of life. This program is designed to provide you with the opportunity to have a [employer] care manager partner with you for the following services: 
 
 1) when you come home from the hospital or emergency room 2) when help is needed to manage your disease 3) when you need assistance coordinating services or appointments [employer] is dedicated to empowering the good health of its community and improving the quality of care and care experiences for employees and their families. We are committed to safeguarding patient confidentiality and privacy, assuring that every employee has the respect he or she deserves in managing their health. The information shared with your care manager will not be shared with anyone else aside from those you identify as part of your care team, and will only be used to assist you with any identified care needs. Please contact me if you would like this service provided to you. Sincerely, Elizabeth Troncoso RN, BSN  Employee Care Manager [email or web address]

## 2020-02-19 NOTE — PROGRESS NOTES
Patient identified as eligible for [address] [place] [address] services. Second telephone outreach attempted. Left discreet voicemail with this CM confidential contact information. Will send UTR letter.

## 2020-03-13 ENCOUNTER — APPOINTMENT (OUTPATIENT)
Dept: GENERAL RADIOLOGY | Age: 45
End: 2020-03-13
Attending: NURSE PRACTITIONER
Payer: COMMERCIAL

## 2020-03-13 ENCOUNTER — HOSPITAL ENCOUNTER (EMERGENCY)
Age: 45
Discharge: HOME | End: 2020-03-13
Attending: EMERGENCY MEDICINE
Payer: COMMERCIAL

## 2020-03-13 VITALS
TEMPERATURE: 98 F | WEIGHT: 280 LBS | HEIGHT: 75 IN | SYSTOLIC BLOOD PRESSURE: 144 MMHG | BODY MASS INDEX: 34.82 KG/M2 | RESPIRATION RATE: 18 BRPM | HEART RATE: 104 BPM | OXYGEN SATURATION: 97 % | DIASTOLIC BLOOD PRESSURE: 90 MMHG

## 2020-03-13 DIAGNOSIS — R53.83 FATIGUE, UNSPECIFIED TYPE: Primary | ICD-10-CM

## 2020-03-13 LAB
ANION GAP SERPL CALC-SCNC: 7 MMOL/L (ref 7–16)
BASOPHILS # BLD: 0 K/UL (ref 0–0.2)
BASOPHILS NFR BLD: 1 % (ref 0–2)
BUN SERPL-MCNC: 19 MG/DL (ref 6–23)
CALCIUM SERPL-MCNC: 8.8 MG/DL (ref 8.3–10.4)
CHLORIDE SERPL-SCNC: 106 MMOL/L (ref 98–107)
CO2 SERPL-SCNC: 23 MMOL/L (ref 21–32)
CREAT SERPL-MCNC: 1.29 MG/DL (ref 0.8–1.5)
DIFFERENTIAL METHOD BLD: ABNORMAL
EOSINOPHIL # BLD: 0.1 K/UL (ref 0–0.8)
EOSINOPHIL NFR BLD: 2 % (ref 0.5–7.8)
ERYTHROCYTE [DISTWIDTH] IN BLOOD BY AUTOMATED COUNT: 13 % (ref 11.9–14.6)
FLUAV AG NPH QL IA: NEGATIVE
FLUBV AG NPH QL IA: NEGATIVE
GLUCOSE SERPL-MCNC: 258 MG/DL (ref 65–100)
HCT VFR BLD AUTO: 39.3 % (ref 41.1–50.3)
HGB BLD-MCNC: 13.3 G/DL (ref 13.6–17.2)
IMM GRANULOCYTES # BLD AUTO: 0 K/UL (ref 0–0.5)
IMM GRANULOCYTES NFR BLD AUTO: 1 % (ref 0–5)
LYMPHOCYTES # BLD: 4.4 K/UL (ref 0.5–4.6)
LYMPHOCYTES NFR BLD: 52 % (ref 13–44)
MCH RBC QN AUTO: 27.8 PG (ref 26.1–32.9)
MCHC RBC AUTO-ENTMCNC: 33.8 G/DL (ref 31.4–35)
MCV RBC AUTO: 82.2 FL (ref 79.6–97.8)
MONOCYTES # BLD: 0.4 K/UL (ref 0.1–1.3)
MONOCYTES NFR BLD: 5 % (ref 4–12)
NEUTS SEG # BLD: 3.5 K/UL (ref 1.7–8.2)
NEUTS SEG NFR BLD: 41 % (ref 43–78)
NRBC # BLD: 0 K/UL (ref 0–0.2)
PLATELET # BLD AUTO: 176 K/UL (ref 150–450)
PMV BLD AUTO: 10.7 FL (ref 9.4–12.3)
POTASSIUM SERPL-SCNC: 4 MMOL/L (ref 3.5–5.1)
RBC # BLD AUTO: 4.78 M/UL (ref 4.23–5.6)
SODIUM SERPL-SCNC: 136 MMOL/L (ref 136–145)
SPECIMEN SOURCE: NORMAL
WBC # BLD AUTO: 8.5 K/UL (ref 4.3–11.1)

## 2020-03-13 PROCEDURE — 71046 X-RAY EXAM CHEST 2 VIEWS: CPT

## 2020-03-13 PROCEDURE — 99283 EMERGENCY DEPT VISIT LOW MDM: CPT

## 2020-03-13 PROCEDURE — 87804 INFLUENZA ASSAY W/OPTIC: CPT

## 2020-03-13 PROCEDURE — 80048 BASIC METABOLIC PNL TOTAL CA: CPT

## 2020-03-13 PROCEDURE — 85025 COMPLETE CBC W/AUTO DIFF WBC: CPT

## 2020-03-13 NOTE — ED NOTES
I have reviewed discharge instructions with the patient. The patient verbalized understanding. Patient left ED via Discharge Method: ambulatory to Home with self. Opportunity for questions and clarification provided. Patient given 0 scripts. Work note provided. No e-sign        To continue your aftercare when you leave the hospital, you may receive an automated call from our care team to check in on how you are doing. This is a free service and part of our promise to provide the best care and service to meet your aftercare needs.  If you have questions, or wish to unsubscribe from this service please call [phone]. Thank you for Choosing our [facility] Emergency Department.

## 2020-03-13 NOTE — ED PROVIDER NOTES
Patient presents with fatigue for the past week. He states this past Sunday he had a fever of 101.4 orally and had a cough. He states no fever or cough since Sunday. He states he has been having night sweats as well. He states he has not traveled out of the country in the last 30 days     The history is provided by the patient.         Past Medical History:   Diagnosis Date    Diabetes ([address]     Hypertension        Past Surgical History:   Procedure Laterality Date    HX APPENDECTOMY           Family History:   Problem Relation Age of Onset    Cancer Mother 61        colon cance    Diabetes Mother     Hypertension Mother     Diabetes Sister     Hypertension Sister     Hypertension Brother     Diabetes Brother        Social History     Socioeconomic History    Marital status:      Spouse name: Not on file    Number of children: Not on file    Years of education: Not on file    Highest education level: Not on file   Occupational History    Occupation: manage psychiatric facility   Social Needs    Financial resource strain: Not on file    Food insecurity     Worry: Not on file     Inability: Not on file    Transportation needs     Medical: Not on file     Non-medical: Not on file   Tobacco Use    Smoking status: Never Smoker    Smokeless tobacco: Never Used   Substance and Sexual Activity    Alcohol use: Not Currently    Drug use: Not Currently    Sexual activity: Not on file   Lifestyle    Physical activity     Days per week: Not on file     Minutes per session: Not on file    Stress: Not on file   Relationships    Social connections     Talks on phone: Not on file     Gets together: Not on file     Attends Restorationist service: Not on file     Active member of club or organization: Not on file     Attends meetings of clubs or organizations: Not on file     Relationship status: Not on file    Intimate partner violence     Fear of current or ex partner: Not on file     Emotionally abused: Not on file     Physically abused: Not on file     Forced sexual activity: Not on file   Other Topics Concern    Not on file   Social History Narrative    Not on file         ALLERGIES: Tamiflu [oseltamivir] and Tramadol    Review of Systems   Constitutional: Positive for diaphoresis, fatigue and fever (in the past week). Respiratory: Positive for cough (5 days ago). Cardiovascular: Negative for chest pain. Gastrointestinal: Negative for abdominal pain. Musculoskeletal: Negative for arthralgias and myalgias. Vitals:    03/13/20 1336 03/13/20 1410 03/13/20 1557   BP: 142/78 (!) 138/94 144/90   Pulse: (!) 110 (!) 108 (!) 104   Resp: 18 18 18   Temp: 98.4 °F (36.9 °C) 98.4 °F (36.9 °C) 98 °F (36.7 °C)   SpO2: 98% 97% 97%   Weight: 127 kg (280 lb)     Height: 6' 3\" (1.905 m)              Physical Exam  Vitals signs and nursing note reviewed. Constitutional:       Appearance: Normal appearance. HENT:      Head: Normocephalic and atraumatic. Right Ear: Tympanic membrane normal.      Left Ear: Tympanic membrane normal.      Nose: Nose normal.      Mouth/Throat:      Mouth: Mucous membranes are moist.      Pharynx: Oropharynx is clear. Eyes:      Pupils: Pupils are equal, round, and reactive to light. Neck:      Musculoskeletal: Normal range of motion and neck supple. Cardiovascular:      Rate and Rhythm: Normal rate and regular rhythm. Pulses: Normal pulses. Heart sounds: Normal heart sounds. Pulmonary:      Effort: Pulmonary effort is normal.      Breath sounds: Normal breath sounds. No decreased breath sounds or wheezing. Abdominal:      General: Abdomen is flat. There is no distension. Tenderness: There is no abdominal tenderness. Skin:     General: Skin is warm and dry. Neurological:      General: No focal deficit present. Mental Status: He is alert and oriented to person, place, and time. GCS: GCS eye subscore is 4. GCS verbal subscore is 5.  GCS motor subscore is 6. Cranial Nerves: Cranial nerves are intact. Sensory: Sensation is intact. Motor: Motor function is intact. Psychiatric:         Mood and Affect: Mood normal.         Behavior: Behavior normal.        Recent Results (from the past 12 hour(s))   INFLUENZA A & B AG (RAPID TEST)    Collection Time: 03/13/20  1:38 PM   Result Value Ref Range    Influenza A Ag NEGATIVE  NEG      Influenza B Ag NEGATIVE  NEG      Source Nasopharyngeal     METABOLIC PANEL, BASIC    Collection Time: 03/13/20  2:19 PM   Result Value Ref Range    Sodium 136 136 - 145 mmol/L    Potassium 4.0 3.5 - 5.1 mmol/L    Chloride 106 98 - 107 mmol/L    CO2 23 21 - 32 mmol/L    Anion gap 7 7 - 16 mmol/L    Glucose 258 (H) 65 - 100 mg/dL    BUN 19 6 - 23 MG/DL    Creatinine 1.29 0.8 - 1.5 MG/DL    GFR est AA >60 >60 ml/min/1.73m2    GFR est non-AA >60 >60 ml/min/1.73m2    Calcium 8.8 8.3 - 10.4 MG/DL   CBC WITH AUTOMATED DIFF    Collection Time: 03/13/20  2:19 PM   Result Value Ref Range    WBC 8.5 4.3 - 11.1 K/uL    RBC 4.78 4.23 - 5.6 M/uL    HGB 13.3 (L) 13.6 - 17.2 g/dL    HCT 39.3 (L) 41.1 - 50.3 %    MCV 82.2 79.6 - 97.8 FL    MCH 27.8 26.1 - 32.9 PG    MCHC 33.8 31.4 - 35.0 g/dL    RDW 13.0 11.9 - 14.6 %    PLATELET 471 413 - 867 K/uL    MPV 10.7 9.4 - 12.3 FL    ABSOLUTE NRBC 0.00 0.0 - 0.2 K/uL    DF AUTOMATED      NEUTROPHILS 41 (L) 43 - 78 %    LYMPHOCYTES 52 (H) 13 - 44 %    MONOCYTES 5 4.0 - 12.0 %    EOSINOPHILS 2 0.5 - 7.8 %    BASOPHILS 1 0.0 - 2.0 %    IMMATURE GRANULOCYTES 1 0.0 - 5.0 %    ABS. NEUTROPHILS 3.5 1.7 - 8.2 K/UL    ABS. LYMPHOCYTES 4.4 0.5 - 4.6 K/UL    ABS. MONOCYTES 0.4 0.1 - 1.3 K/UL    ABS. EOSINOPHILS 0.1 0.0 - 0.8 K/UL    ABS. BASOPHILS 0.0 0.0 - 0.2 K/UL    ABS. IMM. GRANS. 0.0 0.0 - 0.5 K/UL     Xr Chest Pa Lat    Result Date: 3/13/2020  Two view chest History: Cough, fatigue. Comparison: 09/17/2019 Findings: The heart and mediastinal silhouette are normal in size and configuration.  The lungs and pleural spaces are clear. The pulmonary vascularity is within normal limits. The visualized osseous structures are unremarkable. Impression: No active disease in the chest.     MDM  Number of Diagnoses or Management Options  Fatigue, unspecified type:   Diagnosis management comments: Negative flu. Elevated BGL but patient is taking steroids for gout. No other acute abnormalities on lab work. Chest xray negative for pneumonia.         Amount and/or Complexity of Data Reviewed  Clinical lab tests: ordered and reviewed  Tests in the radiology section of CPT®: ordered and reviewed    Patient Progress  Patient progress: stable         Procedures

## 2020-03-13 NOTE — ED TRIAGE NOTES
Pt states he has felt fatigued and had a low grade fever along with a cough since last Sunday. Highest temperature was 101.4 orally. Denies getting a flu shot. Has had night sweats the last 4 nights.

## 2020-03-13 NOTE — LETTER
[address] [facility] EMERGENCY DEPT 
[address] [facility] [address] 
[phone] Work/School Note Date: 3/13/2020 To Whom It May concern: 
 
Dafne Bolanos was seen and treated today in the emergency room by the following provider(s): 
Attending Provider: Gino Tena MD 
Nurse Practitioner: GERTRUDE Bai. Dafne Bolanos needs to be excused from work 03/15/2020.  
 
Sincerely, 
 
 
 
 
GERTRUDE Vera

## 2020-03-13 NOTE — DISCHARGE INSTRUCTIONS
Rest and increase fluids by mouth. Follow up with your primary care provider for a recheck if symptoms fail to improve or worsen. Return to the emergency department as needed.

## 2020-03-16 ENCOUNTER — PATIENT OUTREACH (OUTPATIENT)
Dept: OTHER | Age: 45
End: 2020-03-16

## 2020-03-16 NOTE — PROGRESS NOTES
Patient identified as eligible for [address] [place] [address] services. Patient seen in ER on 3/13/20 for complaints of dizziness and fatigue. Third telephone outreach attempted. Left discreet voicemail with this CM confidential contact information. Will attempt another call tomorrow.

## 2020-03-17 ENCOUNTER — PATIENT OUTREACH (OUTPATIENT)
Dept: OTHER | Age: 45
End: 2020-03-17

## 2020-03-17 NOTE — PROGRESS NOTES
COVID-19 Screening Initial Follow-up Note    Patient contacted regarding COVID-19  risk. Care Transition Nurse/ Ambulatory Care Manager contacted the patient by telephone to perform post discharge assessment. Verified name and  with patient as identifiers. Provided introduction to self, and explanation of the ACM role, and reason for call due to risk factors for infection and/or exposure to COVID-19. Patient was seen in ED at [facility] on 3/13/20 for complaints of fever, cough, weakness on  prior to ED visit on Friday. Patient symptoms were improving at time of visit and patient was not tested for Covid-19. Patient reports today he is feeling 100% better with no further symptoms and has returned to work and usual activity. Reviewed symptoms and instructions for new symptoms or concerns. Patient verbalized understanding. Will follow up in 14 days. Symptoms reviewed with patient who verbalized the following symptoms:   Fever no    Fatigue no   Pain or aching joints no  Cough no  Shortness of breath no    Confusion or unusual change in mental status no    Chills or shaking no    Sweating no    Fast heart rate no    Fast breathing no    Dizziness/lightheadedness no    Less urine output no    Cold, clammy, and pale skin no  Low body temperature no          Patient has following risk factors of:DM ACM reviewed discharge instructions, medical action plan and red flags such as increased shortness of breath, increasing fever and signs of decompensation with patient who verbalized understanding. Discussed exposure protocols and quarantine with CDC Guidelines What to do if you are sick with coronavirus disease 2019 Patient who was given an opportunity for questions and concerns. The patient agrees to contact the [facility] exposure line, local health department and PCP office for questions related to their healthcare. ECM provided contact information for future reference.     Goal:   Demonstrates no signs of complications or red flags in 14 days;   Review and discuss importance of monitoring and reporting red flags;   Review medications and when taken with patient to ensure understanding;   Educate patient when to call the [facility] exposure line, local health department and PCP office or 911;   Assess for any barriers with care access or mobility needs at home;      Plan for follow-up call in 14 days based on severity of symptoms and risk factors

## 2020-03-31 ENCOUNTER — PATIENT OUTREACH (OUTPATIENT)
Dept: OTHER | Age: 45
End: 2020-03-31

## 2020-03-31 NOTE — PROGRESS NOTES
Patient resolved from Transition of Care episode on 3/31/20  Patient/family has been provided the following resources and education related to COVID-19:                         Signs, symptoms and red flags related to COVID-19            CDC exposure and quarantine guidelines            [facility] exposure contact - [phone]            Contact for their local Department of Health                 Patient currently reports that he is symptom free and has returned to work and normal activity. No further outreach scheduled with this ACM. Episode of Care resolved. Patient has this ACM contact information if future needs arise.

## 2020-04-30 ENCOUNTER — HOSPITAL ENCOUNTER (EMERGENCY)
Age: 45
Discharge: HOME | End: 2020-04-30
Attending: EMERGENCY MEDICINE
Payer: COMMERCIAL

## 2020-04-30 VITALS
DIASTOLIC BLOOD PRESSURE: 99 MMHG | BODY MASS INDEX: 34.82 KG/M2 | WEIGHT: 280 LBS | SYSTOLIC BLOOD PRESSURE: 143 MMHG | RESPIRATION RATE: 17 BRPM | OXYGEN SATURATION: 98 % | HEART RATE: 91 BPM | HEIGHT: 75 IN

## 2020-04-30 DIAGNOSIS — R07.9 CHEST PAIN, UNSPECIFIED TYPE: Primary | ICD-10-CM

## 2020-04-30 LAB
ALBUMIN SERPL-MCNC: 3.5 G/DL (ref 3.5–5)
ALBUMIN/GLOB SERPL: 0.9 {RATIO} (ref 1.2–3.5)
ALP SERPL-CCNC: 106 U/L (ref 50–136)
ALT SERPL-CCNC: 24 U/L (ref 12–65)
ANION GAP SERPL CALC-SCNC: 5 MMOL/L (ref 7–16)
AST SERPL-CCNC: 17 U/L (ref 15–37)
ATRIAL RATE: 93 BPM
BASOPHILS # BLD: 0.1 K/UL (ref 0–0.2)
BASOPHILS NFR BLD: 1 % (ref 0–2)
BILIRUB SERPL-MCNC: 0.4 MG/DL (ref 0.2–1.1)
BUN SERPL-MCNC: 12 MG/DL (ref 6–23)
CALCIUM SERPL-MCNC: 9.2 MG/DL (ref 8.3–10.4)
CALCULATED P AXIS, ECG09: 54 DEGREES
CALCULATED R AXIS, ECG10: 59 DEGREES
CALCULATED T AXIS, ECG11: 29 DEGREES
CHLORIDE SERPL-SCNC: 108 MMOL/L (ref 98–107)
CO2 SERPL-SCNC: 26 MMOL/L (ref 21–32)
CREAT SERPL-MCNC: 1.17 MG/DL (ref 0.8–1.5)
DIAGNOSIS, 93000: NORMAL
DIFFERENTIAL METHOD BLD: ABNORMAL
EOSINOPHIL # BLD: 0.1 K/UL (ref 0–0.8)
EOSINOPHIL NFR BLD: 2 % (ref 0.5–7.8)
ERYTHROCYTE [DISTWIDTH] IN BLOOD BY AUTOMATED COUNT: 13.9 % (ref 11.9–14.6)
GLOBULIN SER CALC-MCNC: 4.1 G/DL (ref 2.3–3.5)
GLUCOSE SERPL-MCNC: 216 MG/DL (ref 65–100)
HCT VFR BLD AUTO: 37.5 % (ref 41.1–50.3)
HGB BLD-MCNC: 12.4 G/DL (ref 13.6–17.2)
IMM GRANULOCYTES # BLD AUTO: 0.1 K/UL (ref 0–0.5)
IMM GRANULOCYTES NFR BLD AUTO: 1 % (ref 0–5)
LYMPHOCYTES # BLD: 3.6 K/UL (ref 0.5–4.6)
LYMPHOCYTES NFR BLD: 46 % (ref 13–44)
MCH RBC QN AUTO: 27.7 PG (ref 26.1–32.9)
MCHC RBC AUTO-ENTMCNC: 33.1 G/DL (ref 31.4–35)
MCV RBC AUTO: 83.9 FL (ref 79.6–97.8)
MONOCYTES # BLD: 0.5 K/UL (ref 0.1–1.3)
MONOCYTES NFR BLD: 7 % (ref 4–12)
NEUTS SEG # BLD: 3.4 K/UL (ref 1.7–8.2)
NEUTS SEG NFR BLD: 44 % (ref 43–78)
NRBC # BLD: 0 K/UL (ref 0–0.2)
P-R INTERVAL, ECG05: 160 MS
PLATELET # BLD AUTO: 291 K/UL (ref 150–450)
PMV BLD AUTO: 10.5 FL (ref 9.4–12.3)
POTASSIUM SERPL-SCNC: 4 MMOL/L (ref 3.5–5.1)
PROT SERPL-MCNC: 7.6 G/DL (ref 6.3–8.2)
Q-T INTERVAL, ECG07: 324 MS
QRS DURATION, ECG06: 90 MS
QTC CALCULATION (BEZET), ECG08: 402 MS
RBC # BLD AUTO: 4.47 M/UL (ref 4.23–5.6)
SODIUM SERPL-SCNC: 139 MMOL/L (ref 136–145)
TROPONIN I SERPL-MCNC: <0.02 NG/ML (ref 0.02–0.05)
TROPONIN I SERPL-MCNC: <0.02 NG/ML (ref 0.02–0.05)
VENTRICULAR RATE, ECG03: 93 BPM
WBC # BLD AUTO: 7.8 K/UL (ref 4.3–11.1)

## 2020-04-30 PROCEDURE — 84484 ASSAY OF TROPONIN QUANT: CPT

## 2020-04-30 PROCEDURE — 93005 ELECTROCARDIOGRAM TRACING: CPT | Performed by: EMERGENCY MEDICINE

## 2020-04-30 PROCEDURE — 85025 COMPLETE CBC W/AUTO DIFF WBC: CPT

## 2020-04-30 PROCEDURE — 99284 EMERGENCY DEPT VISIT MOD MDM: CPT

## 2020-04-30 PROCEDURE — 80053 COMPREHEN METABOLIC PANEL: CPT

## 2020-04-30 NOTE — DISCHARGE INSTRUCTIONS

## 2020-04-30 NOTE — ED NOTES
I have reviewed discharge instructions with the patient. The patient verbalized understanding. Patient left ED via Discharge Method: ambulatory to Home with self  Opportunity for questions and clarification provided. Patient given 0 scripts. No esign        To continue your aftercare when you leave the hospital, you may receive an automated call from our care team to check in on how you are doing. This is a free service and part of our promise to provide the best care and service to meet your aftercare needs.  If you have questions, or wish to unsubscribe from this service please call [phone]. Thank you for Choosing our [address] [place] [address] Emergency Department.

## 2020-04-30 NOTE — ED PROVIDER NOTES
72-year-old -American male with history of diabetes and hypertension developed left-sided chest pain at work this morning. Pain was initially sharp in nature. It did not radiate to his neck back or arms. The pain has almost completely resolved at this time. He was noted to be mildly hypertensive 160/111 when the pain began. He states he was not doing anything physically strenuous at the time but had chest had a disagreement with a coworker. When pain began, patient noted that it was aggravated by palpation. Does report that he is undergone stress testing within the past year and was told that his chest pain is unlikely to be cardiac in origin. The history is provided by the patient. Chest Pain    Pertinent negatives include no abdominal pain, no back pain, no cough, no fever, no headaches, no nausea, no shortness of breath and no vomiting.         Past Medical History:   Diagnosis Date    Diabetes ([facility] [address]     Hypertension        Past Surgical History:   Procedure Laterality Date    HX APPENDECTOMY           Family History:   Problem Relation Age of Onset    Cancer Mother 61        colon cance    Diabetes Mother     Hypertension Mother     Diabetes Sister     Hypertension Sister     Hypertension Brother     Diabetes Brother        Social History     Socioeconomic History    Marital status:      Spouse name: Not on file    Number of children: Not on file    Years of education: Not on file    Highest education level: Not on file   Occupational History    Occupation: manage psychiatric facility   Social Needs    Financial resource strain: Not on file    Food insecurity     Worry: Not on file     Inability: Not on file    Transportation needs     Medical: Not on file     Non-medical: Not on file   Tobacco Use    Smoking status: Never Smoker    Smokeless tobacco: Never Used   Substance and Sexual Activity    Alcohol use: Not Currently    Drug use: Not Currently    Sexual activity: Not on file   Lifestyle    Physical activity     Days per week: Not on file     Minutes per session: Not on file    Stress: Not on file   Relationships    Social connections     Talks on phone: Not on file     Gets together: Not on file     Attends Denominational service: Not on file     Active member of club or organization: Not on file     Attends meetings of clubs or organizations: Not on file     Relationship status: Not on file    Intimate partner violence     Fear of current or ex partner: Not on file     Emotionally abused: Not on file     Physically abused: Not on file     Forced sexual activity: Not on file   Other Topics Concern    Not on file   Social History Narrative    Not on file         ALLERGIES: Tamiflu [oseltamivir] and Tramadol    Review of Systems   Constitutional: Negative for fever. HENT: Negative for congestion. Respiratory: Negative for cough and shortness of breath. Cardiovascular: Positive for chest pain. Gastrointestinal: Negative for abdominal pain, nausea and vomiting. Genitourinary: Negative for dysuria. Musculoskeletal: Negative for back pain and neck pain. Skin: Negative for rash. Neurological: Negative for headaches. Vitals:    04/30/20 1148 04/30/20 1155   BP:  (!) 137/91   Pulse:  93   Resp:  17   SpO2:  98%   Weight: 127 kg (280 lb)    Height: 6' 3\" (1.905 m)             Physical Exam  Vitals signs and nursing note reviewed. Constitutional:       General: He is not in acute distress. Appearance: Normal appearance. He is not toxic-appearing. HENT:      Head: Normocephalic and atraumatic. Nose: Nose normal.      Mouth/Throat:      Mouth: Mucous membranes are moist.      Pharynx: Oropharynx is clear. Eyes:      Conjunctiva/sclera: Conjunctivae normal.      Pupils: Pupils are equal, round, and reactive to light. Neck:      Musculoskeletal: Normal range of motion and neck supple.    Cardiovascular:      Rate and Rhythm: Normal rate and regular rhythm. Pulmonary:      Effort: Pulmonary effort is normal.      Breath sounds: Normal breath sounds. Abdominal:      General: There is no distension. Palpations: Abdomen is soft. Tenderness: There is no abdominal tenderness. Musculoskeletal: Normal range of motion. General: No swelling. Skin:     General: Skin is warm and dry. Neurological:      Mental Status: He is alert and oriented to person, place, and time. Psychiatric:         Mood and Affect: Mood normal.         Behavior: Behavior normal.          MDM  Number of Diagnoses or Management Options  Diagnosis management comments: EKG shows no diagnostic changes. Lab work including 2 troponins normal..  Etiology for his pain not clear but does not appear to be an acute coronary syndrome. He appears safe for discharge home.        Amount and/or Complexity of Data Reviewed  Clinical lab tests: ordered and reviewed    Risk of Complications, Morbidity, and/or Mortality  Presenting problems: moderate  Diagnostic procedures: moderate  Management options: moderate           Procedures

## 2020-04-30 NOTE — ED TRIAGE NOTES
Patient states he was checking his email at work and began having intermitting sharp left upper chest pain without any shortness of breath, nausea, vomiting or dizziness. Patient states pain resolved on its own.  Patient has mask in place during triage

## 2020-04-30 NOTE — LETTER
[address] [facility] EMERGENCY DEPT 
[address] [facility] [facility] 
[phone] Work/School Note Date: 4/30/2020 To Whom It May concern: 
 
Jaime Rene was seen and treated today in the emergency room by the following provider(s): 
Attending Provider: Richar Doshi MD. Jaime Rene may return to work on 5/2/2020.  
 
Sincerely, 
 
 
 
 
Tammy Bowie MD

## 2020-05-01 ENCOUNTER — PATIENT OUTREACH (OUTPATIENT)
Dept: CASE MANAGEMENT | Age: 45
End: 2020-05-01

## 2020-05-01 ENCOUNTER — PATIENT OUTREACH (OUTPATIENT)
Dept: OTHER | Age: 45
End: 2020-05-01

## 2020-05-01 NOTE — PROGRESS NOTES
1st attempt to contact the patient for COVID -19 Risk Education. Unable to contact the patient on the contact information available, left a voice message. This Care Coordinator will attempt to call the patient again within 1 business day.

## 2020-05-01 NOTE — PROGRESS NOTES
Patient on report as eligible for Case Management. Left discreet message on voicemail with this CM contact information. Will attempt to contact again to offer [phone] Management services.

## 2020-05-04 ENCOUNTER — PATIENT OUTREACH (OUTPATIENT)
Dept: CASE MANAGEMENT | Age: 45
End: 2020-05-04

## 2020-05-04 ENCOUNTER — PATIENT OUTREACH (OUTPATIENT)
Dept: OTHER | Age: 45
End: 2020-05-04

## 2020-05-04 NOTE — PROGRESS NOTES
2nd attempt to contact the patient for the COVID 19 Risk Education. Unable to reach on the contact information given. Left a voice message. Episode resolved.

## 2020-05-04 NOTE — PROGRESS NOTES
Patient identified as eligible for [address] [place] [address] services. Second telephone outreach attempted. Left discreet voicemail with this CM confidential contact information. Will send UTR letter.

## 2020-05-04 NOTE — LETTER
Mr. Beverly Fishman [address] [address] Dear Marielena Meade , 
 
My name is Pastora Walker LPN, Employee Care Manager for [employer] and I have been trying to reach you, for follow up after your recent ER visit. The Employee Care Management [facility]) program is a free-of-charge confidential service provided to our employees and their family members covered by the [facility]. The program will provide an employee and his/her family with the [employer]' expertise to assist in navigating the health care delivery system, provider services, and their overall care needsso as to assure and improve health care interactions and enhance the quality of life. This program is designed to provide you with the opportunity to have a [employer] care manager partner with you for the following services: 
 
 1) when you come home from the hospital or emergency room 2) when help is needed to manage your disease 3) when you need assistance coordinating services or appointments [employer] is dedicated to empowering the good health of its community and improving the quality of care and care experiences for employees and their families. We are committed to safeguarding patient confidentiality and privacy, assuring that every employee has the respect he or she deserves in managing their health. The information shared with your care manager will not be shared with anyone else aside from those you identify as part of your care team, and will only be used to assist you with any identified care needs. Please contact me if you would like this service provided to you. Sincerely, Pastora Walker LPN  [facility] AND SURGERY [facility] Care Coordinator [address] [place], [place] [id number] [facility] Office Cell [phone] Fax [email or web address]

## 2020-06-01 ENCOUNTER — PATIENT OUTREACH (OUTPATIENT)
Dept: OTHER | Age: 45
End: 2020-06-01

## 2020-06-01 NOTE — PROGRESS NOTES
Resolving current episode for case management due to patient unable to reach. Patient has not been reached after repeated calls and letters. Letter  previously sent to patient notifying completion of services due to unable to reach. This writer's contact information and information regarding program services included in materials sent.

## 2020-06-10 ENCOUNTER — HOSPITAL ENCOUNTER (OUTPATIENT)
Dept: CT IMAGING | Age: 45
Discharge: HOME | End: 2020-06-10
Attending: INTERNAL MEDICINE
Payer: COMMERCIAL

## 2020-06-10 DIAGNOSIS — R42 DIZZINESS: ICD-10-CM

## 2020-06-10 PROCEDURE — 70450 CT HEAD/BRAIN W/O DYE: CPT

## 2020-09-14 ENCOUNTER — HOSPITAL ENCOUNTER (EMERGENCY)
Age: 45
Discharge: HOME | End: 2020-09-14
Attending: EMERGENCY MEDICINE
Payer: COMMERCIAL

## 2020-09-14 VITALS
WEIGHT: 275 LBS | RESPIRATION RATE: 18 BRPM | OXYGEN SATURATION: 99 % | BODY MASS INDEX: 34.19 KG/M2 | SYSTOLIC BLOOD PRESSURE: 124 MMHG | TEMPERATURE: 98.3 F | HEIGHT: 75 IN | DIASTOLIC BLOOD PRESSURE: 80 MMHG | HEART RATE: 71 BPM
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
## 2020-09-14 DIAGNOSIS — S29.011A MUSCLE STRAIN OF CHEST WALL, INITIAL ENCOUNTER: ICD-10-CM

## 2020-09-14 PROCEDURE — 99283 EMERGENCY DEPT VISIT LOW MDM: CPT

## 2020-09-14 RX ORDER — NAPROXEN SODIUM 550 MG/1
550 TABLET ORAL
Qty: 20 TAB | Refills: 0 | Status: SHIPPED | OUTPATIENT
Start: 2020-09-14 | End: 2021-10-14

## 2020-09-14 RX ORDER — CYCLOBENZAPRINE HCL 10 MG
10 TABLET ORAL
Qty: 11 TAB | Refills: 0 | Status: SHIPPED | OUTPATIENT
Start: 2020-09-14 | End: 2020-09-30 | Stop reason: ALTCHOICE
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
## 2020-09-14 NOTE — ED NOTES
I have reviewed discharge instructions with the patient. The patient verbalized understanding. Patient left ED via Discharge Method: ambulatory to Home with self    Opportunity for questions and clarification provided. Patient given 2 scripts. No esign        To continue your aftercare when you leave the hospital, you may receive an automated call from our care team to check in on how you are doing. This is a free service and part of our promise to provide the best care and service to meet your aftercare needs.  If you have questions, or wish to unsubscribe from this service please call [phone]. Thank you for Choosing our [facility] Emergency Department.

## 2020-09-14 NOTE — LETTER
[facility] EMERGENCY DEPT 
[address] [facility] [place] 
[phone] Work/School Note Date: 9/14/2020 To Whom It May concern: 
 
Zaida Strange was seen and treated today in the emergency room by the following provider(s): 
Attending Provider: Mac Guy MD. Zaida Strange may return to work on 9/16/20. Sincerely, Kerwin Mead
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
## 2020-09-15 ENCOUNTER — PATIENT OUTREACH (OUTPATIENT)
Dept: OTHER | Age: 45
End: 2020-09-15

## 2020-09-15 NOTE — PROGRESS NOTES
Patient on report as eligible for Case Management. Left discreet message on voicemail with this CM contact information. Will attempt to contact again to offer [phone] Management services.

## 2020-09-16 ENCOUNTER — PATIENT OUTREACH (OUTPATIENT)
Dept: OTHER | Age: 45
End: 2020-09-16

## 2020-09-16 NOTE — LETTER
[facility] Oracio Wallace [facility] [address] Dear  Anselmo Ovalleandra, My name is Ondina Sanchez RN, Employee Care Manager for [facility] and I have been trying to reach you. The Employee Care Management [facility]) program is a free-of-charge confidential service provided to our employees and their family members covered by the [facility]. The program will provide an employee and his/her family with the [facility] expertise to assist in navigating the health care delivery system, provider services, and their overall care needsso as to assure and improve health care interactions and enhance the quality of life. This program is designed to provide you with the opportunity to have a [facility] care manager partner with you for the following services: 
 
 1) when you come home from the hospital or emergency room 2) when help is needed to manage your disease 3) when you need assistance coordinating services or appointments [facility] is dedicated to empowering the good health of its community and improving the quality of care and care experiences for employees and their families. We are committed to safeguarding patient confidentiality and privacy, assuring that every employee has the respect he or she deserves in managing their health. The information shared with your care manager will not be shared with anyone else aside from those you identify as part of your care team and will only be used to assist you with any identified care needs. Please contact me if you would like this service provided to you. Sincerely, Ondina Sanchez RN, BSN  Employee Care Manager [email or web address]

## 2020-09-16 NOTE — PROGRESS NOTES
Patient identified as eligible for [address] [place] [address] services. Second telephone outreach attempted. Left discreet voicemail with this CM confidential contact information. Will send UTR letter.

## 2020-10-01 ENCOUNTER — HOSPITAL ENCOUNTER (OUTPATIENT)
Dept: GENERAL RADIOLOGY | Age: 45
Discharge: HOME | End: 2020-10-01
Attending: INTERNAL MEDICINE
Payer: COMMERCIAL

## 2020-10-01 DIAGNOSIS — M54.50 ACUTE MIDLINE LOW BACK PAIN WITHOUT SCIATICA: ICD-10-CM

## 2020-10-01 PROCEDURE — 72110 X-RAY EXAM L-2 SPINE 4/>VWS: CPT

## 2020-10-01 NOTE — PROGRESS NOTES
Negative xray for compression fx. Discussed all with patient. Patient demonstrated understanding      Pt to call back next week if not improving.

## 2020-10-14 ENCOUNTER — PATIENT OUTREACH (OUTPATIENT)
Dept: OTHER | Age: 45
End: 2020-10-14

## 2020-10-14 NOTE — LETTER
Mr. Yanci Ruiz [address] [address] Dear Mr. Analia Rivas, My name is Ramesh Duron RN, [facility], Associate Care Manager [facility] Management (ACM) program.  I have been trying to reach you for a few weeks to offer an opportunity to enroll in the [facility] Health program. 
 
 ACM is a new model of care designed to improve the coordination of your health care with an emphasis on your overall well-being. This confidential program is offered free of charge to [facility] participants and their covered family members. As a nurse care manager, my role is to partner with you to help you achieve your health goals and support you in optimizing your health. As healthcare providers, we know that patients do better when they have close follow up with a primary care provider (PCP). I can help you find one that is convenient to you and covered by your insurance. I can also help you understand any after visit instructions, such as what symptoms to watch out for, or any new or changed medications. We can work together using your preferred communication -- telephone, email, [employer]. If you do not have a [employer] account, I can help you request access. Our program is designed to provide you with the opportunity to have a nurse care manager partner with you for your healthcare needs. Since I was unable to reach you over the past few weeks, I am closing out this current episode of care, but will remain available to you should you have any questions. Please contact me at the below number if I can provide you with assistance for any of the above services. Sincerely, Ramesh Duron RN, BSN, [address] [place] [address]  [email or web address]

## 2021-04-28 ENCOUNTER — APPOINTMENT (OUTPATIENT)
Dept: GENERAL RADIOLOGY | Age: 46
End: 2021-04-28
Attending: EMERGENCY MEDICINE

## 2021-04-28 ENCOUNTER — HOSPITAL ENCOUNTER (EMERGENCY)
Age: 46
Discharge: HOME | End: 2021-04-28
Attending: EMERGENCY MEDICINE

## 2021-04-28 VITALS
HEIGHT: 75 IN | RESPIRATION RATE: 15 BRPM | DIASTOLIC BLOOD PRESSURE: 84 MMHG | TEMPERATURE: 97.8 F | WEIGHT: 310 LBS | BODY MASS INDEX: 38.54 KG/M2 | OXYGEN SATURATION: 96 % | HEART RATE: 89 BPM | SYSTOLIC BLOOD PRESSURE: 148 MMHG

## 2021-04-28 DIAGNOSIS — K52.9 GASTROENTERITIS, ACUTE: Primary | ICD-10-CM

## 2021-04-28 LAB
ALBUMIN SERPL-MCNC: 3.5 G/DL (ref 3.5–5)
ALBUMIN/GLOB SERPL: 0.9 {RATIO} (ref 1.2–3.5)
ALP SERPL-CCNC: 106 U/L (ref 50–136)
ALT SERPL-CCNC: 31 U/L (ref 12–65)
ANION GAP SERPL CALC-SCNC: 6 MMOL/L (ref 7–16)
AST SERPL-CCNC: 16 U/L (ref 15–37)
ATRIAL RATE: 94 BPM
BASOPHILS # BLD: 0.1 K/UL (ref 0–0.2)
BASOPHILS NFR BLD: 1 % (ref 0–2)
BILIRUB SERPL-MCNC: 0.6 MG/DL (ref 0.2–1.1)
BNP SERPL-MCNC: 283 PG/ML (ref 5–125)
BUN SERPL-MCNC: 10 MG/DL (ref 6–23)
CALCIUM SERPL-MCNC: 8.9 MG/DL (ref 8.3–10.4)
CALCULATED P AXIS, ECG09: 38 DEGREES
CALCULATED R AXIS, ECG10: 42 DEGREES
CALCULATED T AXIS, ECG11: 35 DEGREES
CHLORIDE SERPL-SCNC: 109 MMOL/L (ref 98–107)
CO2 SERPL-SCNC: 25 MMOL/L (ref 21–32)
COVID-19 RAPID TEST, COVR: NOT DETECTED
CREAT SERPL-MCNC: 1.15 MG/DL (ref 0.8–1.5)
DIAGNOSIS, 93000: NORMAL
DIFFERENTIAL METHOD BLD: ABNORMAL
EOSINOPHIL # BLD: 0.2 K/UL (ref 0–0.8)
EOSINOPHIL NFR BLD: 2 % (ref 0.5–7.8)
ERYTHROCYTE [DISTWIDTH] IN BLOOD BY AUTOMATED COUNT: 13.8 % (ref 11.9–14.6)
GLOBULIN SER CALC-MCNC: 3.7 G/DL (ref 2.3–3.5)
GLUCOSE SERPL-MCNC: 148 MG/DL (ref 65–100)
HCT VFR BLD AUTO: 40.8 % (ref 41.1–50.3)
HGB BLD-MCNC: 13.3 G/DL (ref 13.6–17.2)
IMM GRANULOCYTES # BLD AUTO: 0 K/UL (ref 0–0.5)
IMM GRANULOCYTES NFR BLD AUTO: 0 % (ref 0–5)
LIPASE SERPL-CCNC: 75 U/L (ref 73–393)
LYMPHOCYTES # BLD: 4.3 K/UL (ref 0.5–4.6)
LYMPHOCYTES NFR BLD: 55 % (ref 13–44)
MAGNESIUM SERPL-MCNC: 1.8 MG/DL (ref 1.8–2.4)
MCH RBC QN AUTO: 27.6 PG (ref 26.1–32.9)
MCHC RBC AUTO-ENTMCNC: 32.6 G/DL (ref 31.4–35)
MCV RBC AUTO: 84.6 FL (ref 79.6–97.8)
MONOCYTES # BLD: 0.5 K/UL (ref 0.1–1.3)
MONOCYTES NFR BLD: 7 % (ref 4–12)
NEUTS SEG # BLD: 2.8 K/UL (ref 1.7–8.2)
NEUTS SEG NFR BLD: 35 % (ref 43–78)
NRBC # BLD: 0 K/UL (ref 0–0.2)
P-R INTERVAL, ECG05: 162 MS
PLATELET # BLD AUTO: 232 K/UL (ref 150–450)
PMV BLD AUTO: 10.4 FL (ref 9.4–12.3)
POTASSIUM SERPL-SCNC: 3.7 MMOL/L (ref 3.5–5.1)
PROT SERPL-MCNC: 7.2 G/DL (ref 6.3–8.2)
Q-T INTERVAL, ECG07: 338 MS
QRS DURATION, ECG06: 88 MS
QTC CALCULATION (BEZET), ECG08: 422 MS
RBC # BLD AUTO: 4.82 M/UL (ref 4.23–5.6)
SARS-COV-2, COV2: NORMAL
SODIUM SERPL-SCNC: 140 MMOL/L (ref 138–145)
SOURCE, COVRS: NORMAL
TROPONIN-HIGH SENSITIVITY: 29.8 PG/ML (ref 0–14)
TROPONIN-HIGH SENSITIVITY: 30.6 PG/ML (ref 0–14)
VENTRICULAR RATE, ECG03: 94 BPM
WBC # BLD AUTO: 7.8 K/UL (ref 4.3–11.1)

## 2021-04-28 PROCEDURE — 71045 X-RAY EXAM CHEST 1 VIEW: CPT

## 2021-04-28 PROCEDURE — 83880 ASSAY OF NATRIURETIC PEPTIDE: CPT

## 2021-04-28 PROCEDURE — 87635 SARS-COV-2 COVID-19 AMP PRB: CPT

## 2021-04-28 PROCEDURE — 99284 EMERGENCY DEPT VISIT MOD MDM: CPT

## 2021-04-28 PROCEDURE — 80053 COMPREHEN METABOLIC PANEL: CPT

## 2021-04-28 PROCEDURE — 83735 ASSAY OF MAGNESIUM: CPT

## 2021-04-28 PROCEDURE — 74011250636 HC RX REV CODE- 250/636: Performed by: EMERGENCY MEDICINE

## 2021-04-28 PROCEDURE — 83690 ASSAY OF LIPASE: CPT

## 2021-04-28 PROCEDURE — 93005 ELECTROCARDIOGRAM TRACING: CPT

## 2021-04-28 PROCEDURE — 85025 COMPLETE CBC W/AUTO DIFF WBC: CPT

## 2021-04-28 PROCEDURE — 84484 ASSAY OF TROPONIN QUANT: CPT

## 2021-04-28 PROCEDURE — 74011250637 HC RX REV CODE- 250/637: Performed by: EMERGENCY MEDICINE

## 2021-04-28 RX ORDER — ONDANSETRON 4 MG/1
4 TABLET, ORALLY DISINTEGRATING ORAL
Qty: 20 TAB | Refills: 0 | Status: SHIPPED | OUTPATIENT
Start: 2021-04-28 | End: 2021-10-14

## 2021-04-28 RX ORDER — ACETAMINOPHEN 500 MG
1000 TABLET ORAL
Status: COMPLETED | OUTPATIENT
Start: 2021-04-28 | End: 2021-04-28

## 2021-04-28 RX ADMIN — ACETAMINOPHEN 1000 MG: 500 TABLET ORAL at 11:42

## 2021-04-28 RX ADMIN — SODIUM CHLORIDE 1000 ML: 900 INJECTION, SOLUTION INTRAVENOUS at 09:24

## 2021-04-28 NOTE — ED TRIAGE NOTES
Patient reports SOB and bilateral shoulder pain this AM. Patient states he was ill after eating taco bell on Monday AM having multiple episodes of nausea, vomiting, and diarrhea.  Masked

## 2021-04-28 NOTE — ED NOTES
I have reviewed discharge instructions with the patient. The patient verbalized understanding. Patient left ED via Discharge Method: ambulatory to Home with self. Opportunity for questions and clarification provided. Patient given 1 scripts. To continue your aftercare when you leave the hospital, you may receive an automated call from our care team to check in on how you are doing. This is a free service and part of our promise to provide the best care and service to meet your aftercare needs.  If you have questions, or wish to unsubscribe from this service please call [phone]. Thank you for Choosing our [facility] Emergency Department.

## 2021-04-28 NOTE — LETTER
Debora July [facility] EMERGENCY DEPT 
[address] [facility] [address] 
[phone] Work/School Note Date: 4/28/2021 To Whom It May concern: 
 
Willow Son was seen and treated today in the emergency room by the following provider(s): 
Attending Provider: Jessie Saunders MD. Willow Son may return to work on 4/29/21. Sincerely, Dennie Rio, RN

## 2021-04-28 NOTE — ED NOTES
Pt refused PCR covid test stating that the test goes in his nose too far, and he didn't want to experience that.

## 2021-04-28 NOTE — ED PROVIDER NOTES
Patient with high blood pressure and diabetes. Ate [employer] Sunday night. Developed some nausea vomiting and diarrhea afterwards. Monday developed some shortness of breath and body aches. Tuesday no nausea or vomiting but diarrhea continued with continued body aches and shortness of breath. Symptoms worse today so came in. The history is provided by the patient. No  was used. Shortness of Breath  This is a new problem. The problem occurs continuously. The current episode started more than 2 days ago. The problem has been gradually worsening. Associated symptoms include vomiting and abdominal pain. Pertinent negatives include no fever, no headaches, no rhinorrhea, no sore throat, no neck pain, no cough, no sputum production, no wheezing, no orthopnea, no chest pain, no rash, no leg pain and no leg swelling. He has tried nothing for the symptoms. Associated medical issues do not include CAD.         Past Medical History:   Diagnosis Date    Diabetes ([facility] [address]     Hypertension        Past Surgical History:   Procedure Laterality Date    HX APPENDECTOMY           Family History:   Problem Relation Age of Onset    Cancer Mother 61        colon cance    Diabetes Mother     Hypertension Mother     Diabetes Sister     Hypertension Sister     Hypertension Brother     Diabetes Brother        Social History     Socioeconomic History    Marital status:      Spouse name: Not on file    Number of children: Not on file    Years of education: Not on file    Highest education level: Not on file   Occupational History    Occupation: manage psychiatric facility   Social Needs    Financial resource strain: Not on file    Food insecurity     Worry: Not on file     Inability: Not on file    Transportation needs     Medical: Not on file     Non-medical: Not on file   Tobacco Use    Smoking status: Never Smoker    Smokeless tobacco: Never Used   Substance and Sexual Activity    Alcohol use: Not Currently    Drug use: Not Currently    Sexual activity: Not on file   Lifestyle    Physical activity     Days per week: Not on file     Minutes per session: Not on file    Stress: Not on file   Relationships    Social connections     Talks on phone: Not on file     Gets together: Not on file     Attends Yazdanism service: Not on file     Active member of club or organization: Not on file     Attends meetings of clubs or organizations: Not on file     Relationship status: Not on file    Intimate partner violence     Fear of current or ex partner: Not on file     Emotionally abused: Not on file     Physically abused: Not on file     Forced sexual activity: Not on file   Other Topics Concern    Not on file   Social History Narrative    Not on file         ALLERGIES: Tamiflu [oseltamivir] and Tramadol    Review of Systems   Constitutional: Positive for fatigue. Negative for chills and fever. HENT: Negative for rhinorrhea and sore throat. Eyes: Negative for pain and redness. Respiratory: Positive for shortness of breath. Negative for cough, sputum production, chest tightness and wheezing. Cardiovascular: Negative for chest pain, orthopnea and leg swelling. Gastrointestinal: Positive for abdominal pain, diarrhea, nausea and vomiting. Genitourinary: Negative for dysuria and hematuria. Musculoskeletal: Positive for myalgias. Negative for back pain, gait problem, neck pain and neck stiffness. Skin: Negative for color change and rash. Neurological: Negative for weakness, numbness and headaches. All other systems reviewed and are negative. Vitals:    04/28/21 0903   BP: (!) 184/120   Pulse: 98   Resp: 18   Temp: 97.8 °F (36.6 °C)   SpO2: 99%   Weight: 140.6 kg (310 lb)   Height: 6' 3\" (1.905 m)            Physical Exam  Constitutional:       Appearance: Normal appearance. He is well-developed. HENT:      Head: Normocephalic and atraumatic.    Neck:      Musculoskeletal: Normal range of motion and neck supple. Cardiovascular:      Rate and Rhythm: Normal rate and regular rhythm. Pulmonary:      Effort: Respiratory distress (mild increased effort) present. Breath sounds: Normal breath sounds. No wheezing. Abdominal:      General: Bowel sounds are normal.      Palpations: Abdomen is soft. Tenderness: There is abdominal tenderness (mild diffuse). Musculoskeletal: Normal range of motion. General: No swelling. Skin:     General: Skin is warm and dry. Neurological:      Mental Status: He is alert and oriented to person, place, and time. MDM  Number of Diagnoses or Management Options  Diagnosis management comments: Patient Covid negative. Symptoms starting after eating some [employer]. Will discharge with nausea medication and PCP follow-up. Amount and/or Complexity of Data Reviewed  Clinical lab tests: ordered and reviewed  Tests in the radiology section of CPT®: ordered and reviewed  Tests in the medicine section of CPT®: ordered and reviewed    Patient Progress  Patient progress: stable         Procedures        EKG: normal sinus rhythm, nonspecific ST and T waves changes. Rate 99. XR CHEST PORT (Final result)  Result time 04/28/21 09:27:43  Final result by Anahi George DO (04/28/21 09:27:43)                Impression:    Unremarkable portable chest radiograph                     Narrative:    Portable chest:     History: ED ORDER-Reason for exam: syncope-Patient reports SOB and bilateral   shoulder pain this AM. Patient states he was ill after eating taco bell on   Monday AM having multiple episodes of nausea, vomiting, and diarrhea. Comparison: 03/13/2020     Findings: A single view of the chest was obtained at 9:07 AM.     The cardiac and mediastinal silhouette are normal in size and configuration. The   lungs and pleural spaces are clear.  The pulmonary vascularity is within normal   limits.                   Results Include:    Recent Results (from the past 24 hour(s))   EKG, 12 LEAD, INITIAL    Collection Time: 04/28/21  9:07 AM   Result Value Ref Range    Ventricular Rate 94 BPM    Atrial Rate 94 BPM    P-R Interval 162 ms    QRS Duration 88 ms    Q-T Interval 338 ms    QTC Calculation (Bezet) 422 ms    Calculated P Axis 38 degrees    Calculated R Axis 42 degrees    Calculated T Axis 35 degrees    Diagnosis       Normal sinus rhythm  Normal ECG  When compared with ECG of 30-APR-2020 11:48,  No significant change was found  Confirmed by MELISSA MCMANUS (), JAIRO SPRAGUE ([id number]) on 4/28/2021 11:41:37 AM     CBC WITH AUTOMATED DIFF    Collection Time: 04/28/21  9:11 AM   Result Value Ref Range    WBC 7.8 4.3 - 11.1 K/uL    RBC 4.82 4.23 - 5.6 M/uL    HGB 13.3 (L) 13.6 - 17.2 g/dL    HCT 40.8 (L) 41.1 - 50.3 %    MCV 84.6 79.6 - 97.8 FL    MCH 27.6 26.1 - 32.9 PG    MCHC 32.6 31.4 - 35.0 g/dL    RDW 13.8 11.9 - 14.6 %    PLATELET 935 433 - 081 K/uL    MPV 10.4 9.4 - 12.3 FL    ABSOLUTE NRBC 0.00 0.0 - 0.2 K/uL    DF AUTOMATED      NEUTROPHILS 35 (L) 43 - 78 %    LYMPHOCYTES 55 (H) 13 - 44 %    MONOCYTES 7 4.0 - 12.0 %    EOSINOPHILS 2 0.5 - 7.8 %    BASOPHILS 1 0.0 - 2.0 %    IMMATURE GRANULOCYTES 0 0.0 - 5.0 %    ABS. NEUTROPHILS 2.8 1.7 - 8.2 K/UL    ABS. LYMPHOCYTES 4.3 0.5 - 4.6 K/UL    ABS. MONOCYTES 0.5 0.1 - 1.3 K/UL    ABS. EOSINOPHILS 0.2 0.0 - 0.8 K/UL    ABS. BASOPHILS 0.1 0.0 - 0.2 K/UL    ABS. IMM.  GRANS. 0.0 0.0 - 0.5 K/UL   METABOLIC PANEL, COMPREHENSIVE    Collection Time: 04/28/21  9:11 AM   Result Value Ref Range    Sodium 140 138 - 145 mmol/L    Potassium 3.7 3.5 - 5.1 mmol/L    Chloride 109 (H) 98 - 107 mmol/L    CO2 25 21 - 32 mmol/L    Anion gap 6 (L) 7 - 16 mmol/L    Glucose 148 (H) 65 - 100 mg/dL    BUN 10 6 - 23 MG/DL    Creatinine 1.15 0.8 - 1.5 MG/DL    GFR est AA >60 >60 ml/min/1.73m2    GFR est non-AA >60 >60 ml/min/1.73m2    Calcium 8.9 8.3 - 10.4 MG/DL    Bilirubin, total 0.6 0.2 - 1.1 MG/DL    ALT (SGPT) 31 12 - 65 U/L    AST (SGOT) 16 15 - 37 U/L    Alk.  phosphatase 106 50 - 136 U/L    Protein, total 7.2 6.3 - 8.2 g/dL    Albumin 3.5 3.5 - 5.0 g/dL    Globulin 3.7 (H) 2.3 - 3.5 g/dL    A-G Ratio 0.9 (L) 1.2 - 3.5     MAGNESIUM    Collection Time: 04/28/21  9:11 AM   Result Value Ref Range    Magnesium 1.8 1.8 - 2.4 mg/dL   TROPONIN-HIGH SENSITIVITY    Collection Time: 04/28/21  9:11 AM   Result Value Ref Range    Troponin-High Sensitivity 30.6 (H) 0 - 14 pg/mL   LIPASE    Collection Time: 04/28/21  9:11 AM   Result Value Ref Range    Lipase 75 73 - 393 U/L   NT-PRO BNP    Collection Time: 04/28/21  9:11 AM   Result Value Ref Range    NT pro- (H) 5 - 125 PG/ML   SARS-COV-2    Collection Time: 04/28/21  9:30 AM   Result Value Ref Range    SARS-CoV-2 Please find results under separate order     COVID-19 RAPID TEST    Collection Time: 04/28/21  9:30 AM   Result Value Ref Range    Specimen source Nasopharyngeal      COVID-19 rapid test Not detected NOTD     TROPONIN-HIGH SENSITIVITY    Collection Time: 04/28/21 11:27 AM   Result Value Ref Range    Troponin-High Sensitivity 29.8 (H) 0 - 14 pg/mL

## 2021-04-29 ENCOUNTER — PATIENT OUTREACH (OUTPATIENT)
Dept: OTHER | Age: 46
End: 2021-04-29

## 2021-04-29 NOTE — PROGRESS NOTES
Patient on report as eligible for Case Management. Left discreet message on voicemail with this CM contact information. Will attempt to contact again to offer [phone] Management services.

## 2021-05-05 ENCOUNTER — PATIENT OUTREACH (OUTPATIENT)
Dept: OTHER | Age: 46
End: 2021-05-05

## 2021-05-05 NOTE — LETTER
Mr. Loly Villafuerte [address] [address] Dear  Claudette Muellerbrigid, My name is Yeimi Guillaume RN, Associate Care Manager for [facility] and I have been trying to reach you. The Associate Care Management (ACM) program is a free-of-charge confidential service provided to our associates and their family members covered by the [facility]. The program will provide an associate and his/her family with the [facility] expertise to assist in navigating the health care delivery system, provider services, and their overall care needsso as to assure and improve health care interactions and enhance the quality of life. This program is designed to provide you with the opportunity to have a [address] [place] [address] partner with you for support, assistance and education: 
 
 1) when you come home from the hospital or emergency room 2) when help is needed to manage your disease 3) when you need assistance coordinating services or appointments 4) when you need information about new illness, diagnosis or medications 5) to reach your Be Well Health Program criteria, such as Be Well With Diabetes [facility] is dedicated to empowering the good health of its community and improving the quality of care and care experiences for associates and their families. We are committed to safeguarding patient confidentiality and privacy, assuring that every associate has the respect he or she deserves in managing their health. The information shared with your care manager will not be shared with anyone else aside from those you identify as part of your care team and will only be used to assist you with any identified care needs. Please contact me if you would like this service provided to you. Sincerely, ANISHA Coombs, RN, [facility]  Employee Care Manager [email or web address]

## 2021-05-05 NOTE — PROGRESS NOTES
Patient identified as eligible for [address] [place] [address] services. Second telephone outreach attempted. Left discreet voicemail with this CM confidential contact information. Will send UTR letter.

## 2021-08-18 ENCOUNTER — APPOINTMENT (OUTPATIENT)
Dept: GENERAL RADIOLOGY | Age: 46
End: 2021-08-18
Attending: EMERGENCY MEDICINE
Payer: OTHER GOVERNMENT

## 2021-08-18 ENCOUNTER — HOSPITAL ENCOUNTER (EMERGENCY)
Age: 46
Discharge: HOME | End: 2021-08-18
Attending: EMERGENCY MEDICINE
Payer: OTHER GOVERNMENT

## 2021-08-18 ENCOUNTER — APPOINTMENT (OUTPATIENT)
Dept: CT IMAGING | Age: 46
End: 2021-08-18
Attending: EMERGENCY MEDICINE
Payer: OTHER GOVERNMENT

## 2021-08-18 VITALS
WEIGHT: 285 LBS | HEART RATE: 109 BPM | RESPIRATION RATE: 19 BRPM | OXYGEN SATURATION: 95 % | TEMPERATURE: 100.9 F | SYSTOLIC BLOOD PRESSURE: 139 MMHG | HEIGHT: 75 IN | BODY MASS INDEX: 35.43 KG/M2 | DIASTOLIC BLOOD PRESSURE: 90 MMHG

## 2021-08-18 DIAGNOSIS — U07.1 PNEUMONIA DUE TO COVID-19 VIRUS: Primary | ICD-10-CM

## 2021-08-18 DIAGNOSIS — J12.82 PNEUMONIA DUE TO COVID-19 VIRUS: Primary | ICD-10-CM

## 2021-08-18 LAB
ALBUMIN SERPL-MCNC: 3.8 G/DL (ref 3.5–5)
ALBUMIN/GLOB SERPL: 0.7 {RATIO} (ref 1.2–3.5)
ALP SERPL-CCNC: 121 U/L (ref 50–136)
ALT SERPL-CCNC: 36 U/L (ref 12–65)
ANION GAP SERPL CALC-SCNC: 8 MMOL/L (ref 7–16)
AST SERPL-CCNC: 39 U/L (ref 15–37)
ATRIAL RATE: 132 BPM
BACTERIA URNS QL MICRO: 0 /HPF
BASOPHILS # BLD: 0 K/UL (ref 0–0.2)
BASOPHILS NFR BLD: 0 % (ref 0–2)
BILIRUB SERPL-MCNC: 0.6 MG/DL (ref 0.2–1.1)
BUN SERPL-MCNC: 8 MG/DL (ref 6–23)
CALCIUM SERPL-MCNC: 9.1 MG/DL (ref 8.3–10.4)
CALCULATED P AXIS, ECG09: 60 DEGREES
CALCULATED R AXIS, ECG10: 29 DEGREES
CALCULATED T AXIS, ECG11: 47 DEGREES
CASTS URNS QL MICRO: NORMAL /LPF
CHLORIDE SERPL-SCNC: 102 MMOL/L (ref 98–107)
CO2 SERPL-SCNC: 25 MMOL/L (ref 21–32)
COVID-19 RAPID TEST, COVR: DETECTED
CREAT SERPL-MCNC: 1.24 MG/DL (ref 0.8–1.5)
DIAGNOSIS, 93000: NORMAL
DIFFERENTIAL METHOD BLD: ABNORMAL
EOSINOPHIL # BLD: 0 K/UL (ref 0–0.8)
EOSINOPHIL NFR BLD: 0 % (ref 0.5–7.8)
EPI CELLS #/AREA URNS HPF: NORMAL /HPF
ERYTHROCYTE [DISTWIDTH] IN BLOOD BY AUTOMATED COUNT: 13.1 % (ref 11.9–14.6)
GLOBULIN SER CALC-MCNC: 5.1 G/DL (ref 2.3–3.5)
GLUCOSE SERPL-MCNC: 207 MG/DL (ref 65–100)
HCT VFR BLD AUTO: 47.7 % (ref 41.1–50.3)
HGB BLD-MCNC: 15.7 G/DL (ref 13.6–17.2)
IMM GRANULOCYTES # BLD AUTO: 0 K/UL (ref 0–0.5)
IMM GRANULOCYTES NFR BLD AUTO: 1 % (ref 0–5)
LACTATE SERPL-SCNC: 1.3 MMOL/L (ref 0.4–2)
LYMPHOCYTES # BLD: 2.3 K/UL (ref 0.5–4.6)
LYMPHOCYTES NFR BLD: 36 % (ref 13–44)
MCH RBC QN AUTO: 27.2 PG (ref 26.1–32.9)
MCHC RBC AUTO-ENTMCNC: 32.9 G/DL (ref 31.4–35)
MCV RBC AUTO: 82.7 FL (ref 79.6–97.8)
MONOCYTES # BLD: 0.2 K/UL (ref 0.1–1.3)
MONOCYTES NFR BLD: 3 % (ref 4–12)
NEUTS SEG # BLD: 3.8 K/UL (ref 1.7–8.2)
NEUTS SEG NFR BLD: 59 % (ref 43–78)
NRBC # BLD: 0 K/UL (ref 0–0.2)
P-R INTERVAL, ECG05: 138 MS
PLATELET # BLD AUTO: 225 K/UL (ref 150–450)
PMV BLD AUTO: 10.3 FL (ref 9.4–12.3)
POTASSIUM SERPL-SCNC: 3.7 MMOL/L (ref 3.5–5.1)
PROT SERPL-MCNC: 8.9 G/DL (ref 6.3–8.2)
Q-T INTERVAL, ECG07: 310 MS
QRS DURATION, ECG06: 82 MS
QTC CALCULATION (BEZET), ECG08: 459 MS
RBC # BLD AUTO: 5.77 M/UL (ref 4.23–5.6)
RBC #/AREA URNS HPF: NORMAL /HPF
SODIUM SERPL-SCNC: 135 MMOL/L (ref 138–145)
SOURCE, COVRS: ABNORMAL
TROPONIN-HIGH SENSITIVITY: 33.7 PG/ML (ref 0–14)
VENTRICULAR RATE, ECG03: 132 BPM
WBC # BLD AUTO: 6.4 K/UL (ref 4.3–11.1)
WBC URNS QL MICRO: NORMAL /HPF

## 2021-08-18 PROCEDURE — 87040 BLOOD CULTURE FOR BACTERIA: CPT

## 2021-08-18 PROCEDURE — 87635 SARS-COV-2 COVID-19 AMP PRB: CPT

## 2021-08-18 PROCEDURE — 71260 CT THORAX DX C+: CPT

## 2021-08-18 PROCEDURE — 81015 MICROSCOPIC EXAM OF URINE: CPT

## 2021-08-18 PROCEDURE — 81003 URINALYSIS AUTO W/O SCOPE: CPT

## 2021-08-18 PROCEDURE — 74011000636 HC RX REV CODE- 636: Performed by: EMERGENCY MEDICINE

## 2021-08-18 PROCEDURE — 84484 ASSAY OF TROPONIN QUANT: CPT

## 2021-08-18 PROCEDURE — 74011250636 HC RX REV CODE- 250/636: Performed by: EMERGENCY MEDICINE

## 2021-08-18 PROCEDURE — 96374 THER/PROPH/DIAG INJ IV PUSH: CPT

## 2021-08-18 PROCEDURE — 93005 ELECTROCARDIOGRAM TRACING: CPT | Performed by: EMERGENCY MEDICINE

## 2021-08-18 PROCEDURE — 80053 COMPREHEN METABOLIC PANEL: CPT

## 2021-08-18 PROCEDURE — 71045 X-RAY EXAM CHEST 1 VIEW: CPT

## 2021-08-18 PROCEDURE — 99285 EMERGENCY DEPT VISIT HI MDM: CPT

## 2021-08-18 PROCEDURE — 74011250637 HC RX REV CODE- 250/637: Performed by: EMERGENCY MEDICINE

## 2021-08-18 PROCEDURE — 85025 COMPLETE CBC W/AUTO DIFF WBC: CPT

## 2021-08-18 PROCEDURE — M0243 CASIRIVI AND IMDEVI INFUSION: HCPCS

## 2021-08-18 PROCEDURE — 83605 ASSAY OF LACTIC ACID: CPT

## 2021-08-18 PROCEDURE — 74011000258 HC RX REV CODE- 258: Performed by: EMERGENCY MEDICINE

## 2021-08-18 RX ORDER — SODIUM CHLORIDE 0.9 % (FLUSH) 0.9 %
5-10 SYRINGE (ML) INJECTION EVERY 8 HOURS
Status: DISCONTINUED | OUTPATIENT
Start: 2021-08-18 | End: 2021-08-18 | Stop reason: HOSPADM

## 2021-08-18 RX ORDER — ACETAMINOPHEN 500 MG
1000 TABLET ORAL
Status: COMPLETED | OUTPATIENT
Start: 2021-08-18 | End: 2021-08-18

## 2021-08-18 RX ORDER — KETOROLAC TROMETHAMINE 30 MG/ML
30 INJECTION, SOLUTION INTRAMUSCULAR; INTRAVENOUS ONCE
Status: COMPLETED | OUTPATIENT
Start: 2021-08-18 | End: 2021-08-18

## 2021-08-18 RX ORDER — SODIUM CHLORIDE 0.9 % (FLUSH) 0.9 %
10 SYRINGE (ML) INJECTION
Status: COMPLETED | OUTPATIENT
Start: 2021-08-18 | End: 2021-08-18

## 2021-08-18 RX ORDER — SODIUM CHLORIDE 0.9 % (FLUSH) 0.9 %
5-10 SYRINGE (ML) INJECTION AS NEEDED
Status: DISCONTINUED | OUTPATIENT
Start: 2021-08-18 | End: 2021-08-18 | Stop reason: HOSPADM

## 2021-08-18 RX ADMIN — KETOROLAC TROMETHAMINE 30 MG: 30 INJECTION, SOLUTION INTRAMUSCULAR; INTRAVENOUS at 12:50

## 2021-08-18 RX ADMIN — CASIRIVIMAB AND IMDEVIMAB: 600; 600 INJECTION, SOLUTION, CONCENTRATE INTRAVENOUS at 14:19

## 2021-08-18 RX ADMIN — SODIUM CHLORIDE 1000 ML: 900 INJECTION, SOLUTION INTRAVENOUS at 12:49

## 2021-08-18 RX ADMIN — SODIUM CHLORIDE, SODIUM LACTATE, POTASSIUM CHLORIDE, AND CALCIUM CHLORIDE 1000 ML: 600; 310; 30; 20 INJECTION, SOLUTION INTRAVENOUS at 12:24

## 2021-08-18 RX ADMIN — ACETAMINOPHEN 1000 MG: 500 TABLET ORAL at 12:50

## 2021-08-18 RX ADMIN — SODIUM CHLORIDE 100 ML: 900 INJECTION, SOLUTION INTRAVENOUS at 15:01

## 2021-08-18 RX ADMIN — IOPAMIDOL 100 ML: 755 INJECTION, SOLUTION INTRAVENOUS at 15:01

## 2021-08-18 RX ADMIN — Medication 10 ML: at 15:01

## 2021-08-18 NOTE — DISCHARGE INSTRUCTIONS
Monitor pulse oximetry at home. If pulse ox is under 90%, return to the emergency department. Return for other worsening or concerning symptoms such as dehydration, severe shortness of breath. Take Tylenol and Motrin as needed for fevers. Rest.  Drink plenty fluids. You must remain in isolation per CDC guidelines.

## 2021-08-18 NOTE — ED NOTES
I have reviewed discharge instructions with the patient. The patient verbalized understanding. Patient left ED via Discharge Method: ambulatory to Home with SELF. Opportunity for questions and clarification provided. Patient given 0 scripts. To continue your aftercare when you leave the hospital, you may receive an automated call from our care team to check in on how you are doing. This is a free service and part of our promise to provide the best care and service to meet your aftercare needs.  If you have questions, or wish to unsubscribe from this service please call [phone]. Thank you for Choosing our [facility] Emergency Department.

## 2021-08-18 NOTE — ED TRIAGE NOTES
Pt arrives via POV c/o SHOB and body aches. Tested positive last Tuesday for covid. Pt ambulatory RA saturations remained above 94% from lobby to triage. No vaccines. Pt presents with even and unlabored respirations. Hx of DM. No diarrhea today. Masked.

## 2021-08-18 NOTE — ED PROVIDER NOTES
56-year-old male presenting for cough, shortness of breath muscle aches fevers. Diagnosed with Covid 1 week ago. Did not get the vaccine. Gets somewhat short of breath during activity and had a low O2 sat at home but no chest pain or audible wheezing. Patient does have a history of high blood pressure and diabetes. The history is provided by the patient. Shortness of Breath  This is a new problem. The current episode started more than 1 week ago. The problem has been gradually worsening. Associated symptoms include a fever, headaches and cough. Pertinent negatives include no rhinorrhea, no sore throat, no swollen glands, no ear pain, no neck pain, no sputum production, no hemoptysis, no wheezing, no PND, no orthopnea, no chest pain, no syncope, no vomiting, no abdominal pain, no rash, no leg pain, no leg swelling and no claudication. It is unknown what precipitated the problem. He has tried nothing for the symptoms. The treatment provided no relief. Associated medical issues do not include asthma, COPD, pneumonia, chronic lung disease, PE, CAD, heart failure, past MI, DVT or recent surgery.         Past Medical History:   Diagnosis Date    Diabetes ([facility] [address]     Hypertension        Past Surgical History:   Procedure Laterality Date    HX APPENDECTOMY           Family History:   Problem Relation Age of Onset    Cancer Mother 61        colon cance    Diabetes Mother     Hypertension Mother     Diabetes Sister     Hypertension Sister     Hypertension Brother     Diabetes Brother        Social History     Socioeconomic History    Marital status:      Spouse name: Not on file    Number of children: Not on file    Years of education: Not on file    Highest education level: Not on file   Occupational History    Occupation: manage psychiatric facility   Tobacco Use    Smoking status: Never Smoker    Smokeless tobacco: Never Used   Vaping Use    Vaping Use: Never used   Substance and Sexual Activity    Alcohol use: Not Currently    Drug use: Not Currently    Sexual activity: Not on file   Other Topics Concern    Not on file   Social History Narrative    Not on file     Social Determinants of Health     Financial Resource Strain:     Difficulty of Paying Living Expenses:    Food Insecurity:     Worried About Running Out of Food in the Last Year:     [address] in the Last Year:    Transportation Needs:     Lack of Transportation (Medical):  Lack of Transportation (Non-Medical):    Physical Activity:     Days of Exercise per Week:     Minutes of Exercise per Session:    Stress:     Feeling of Stress :    Social Connections:     Frequency of Communication with Friends and Family:     Frequency of Social Gatherings with Friends and Family:     Attends Synagogue Services:     Active Member of Clubs or Organizations:     Attends Club or Organization Meetings:     Marital Status:    Intimate Partner Violence:     Fear of Current or Ex-Partner:     Emotionally Abused:     Physically Abused:     Sexually Abused: ALLERGIES: Tamiflu [oseltamivir] and Tramadol    Review of Systems   Constitutional: Positive for fever. HENT: Negative for ear pain, rhinorrhea and sore throat. Respiratory: Positive for cough and shortness of breath. Negative for hemoptysis, sputum production and wheezing. Cardiovascular: Negative for chest pain, orthopnea, claudication, leg swelling, syncope and PND. Gastrointestinal: Negative for abdominal pain and vomiting. Musculoskeletal: Negative for neck pain. Skin: Negative for rash. Neurological: Positive for headaches. All other systems reviewed and are negative. Vitals:    08/18/21 1135 08/18/21 1215   BP: (!) 148/95    Pulse: (!) 143 (!) 119   Resp: 20 25   Temp: (!) 100.9 °F (38.3 °C)    SpO2: 97% 96%   Weight: 129.3 kg (285 lb)    Height: 6' 3\" (1.905 m)             Physical Exam  Vitals and nursing note reviewed. Constitutional:       Appearance: He is well-developed. He is ill-appearing. HENT:      Head: Normocephalic and atraumatic. Eyes:      Extraocular Movements: Extraocular movements intact. Conjunctiva/sclera: Conjunctivae normal.      Pupils: Pupils are equal, round, and reactive to light. Cardiovascular:      Rate and Rhythm: Regular rhythm. Tachycardia present. Heart sounds: Normal heart sounds. Pulmonary:      Effort: Pulmonary effort is normal.      Breath sounds: Normal breath sounds. Abdominal:      General: Bowel sounds are normal.      Palpations: Abdomen is soft. Musculoskeletal:         General: No deformity. Normal range of motion. Cervical back: Normal range of motion and neck supple. Skin:     General: Skin is warm and dry. Neurological:      Mental Status: He is alert and oriented to person, place, and time. Cranial Nerves: No cranial nerve deficit. Psychiatric:         Behavior: Behavior normal.          MDM  Number of Diagnoses or Management Options  Pneumonia due to COVID-19 virus  Diagnosis management comments: 80-year-old male presenting for viral syndrome consistent with COVID-19. Fortunately during evaluation today he has an O2 sat of 97% on room air. Given his history of hypertension and diabetes he does qualify for Regeneron. 3:47 PM  Assumed care of patient at shift change. Patient history, physical, and results reviewed by myself. Independent exam performed. CTA shows no PE. Will dc after Regeneron     I discussed the results of all labs, procedures, radiographs, and treatments with the patient and available family. Treatment plan is agreed upon and the patient is ready for discharge. Questions about treatment in the ED and differential diagnosis of presenting condition were answered.   Patient was given verbal discharge instructions including, but not limited to, importance of returning to the emergency department for any concern of worsening or continued symptoms. Instructions were given to follow up with a primary care provider or specialist within 1-2 days. Adverse effects of medications, if prescribed, were discussed and patient was advised to refrain from significant physical activity until followed up by primary care physician and to not drive or operate heavy machinery after taking any sedating substances.            Amount and/or Complexity of Data Reviewed  Clinical lab tests: ordered (Results for orders placed or performed during the hospital encounter of 08/18/21  -COVID-19 RAPID TEST       Result                      Value             Ref Range           Specimen source             NASAL                                 COVID-19 rapid test         Detected (AA)     NOTD           -LACTIC ACID       Result                      Value             Ref Range           Lactic acid                 1.3               0.4 - 2.0 MM*  -CBC WITH AUTOMATED DIFF       Result                      Value             Ref Range           WBC                         6.4               4.3 - 11.1 K*       RBC                         5.77 (H)          4.23 - 5.6 M*       HGB                         15.7              13.6 - 17.2 *       HCT                         47.7              41.1 - 50.3 %       MCV                         82.7              79.6 - 97.8 *       MCH                         27.2              26.1 - 32.9 *       MCHC                        32.9              31.4 - 35.0 *       RDW                         13.1              11.9 - 14.6 %       PLATELET                    225               150 - 450 K/*       MPV                         10.3              9.4 - 12.3 FL       ABSOLUTE NRBC               0.00              0.0 - 0.2 K/*       DF                          AUTOMATED                             NEUTROPHILS                 59                43 - 78 %           LYMPHOCYTES                 36                13 - 44 %           MONOCYTES 3 (L)             4.0 - 12.0 %        EOSINOPHILS                 0 (L)             0.5 - 7.8 %         BASOPHILS                   0                 0.0 - 2.0 %         IMMATURE GRANULOCYTES       1                 0.0 - 5.0 %         ABS. NEUTROPHILS            3.8               1.7 - 8.2 K/*       ABS. LYMPHOCYTES            2.3               0.5 - 4.6 K/*       ABS. MONOCYTES              0.2               0.1 - 1.3 K/*       ABS. EOSINOPHILS            0.0               0.0 - 0.8 K/*       ABS. BASOPHILS              0.0               0.0 - 0.2 K/*       ABS. IMM. GRANS.            0.0               0.0 - 0.5 K/*  -METABOLIC PANEL, COMPREHENSIVE       Result                      Value             Ref Range           Sodium                      135 (L)           138 - 145 mm*       Potassium                   3.7               3.5 - 5.1 mm*       Chloride                    102               98 - 107 mmo*       CO2                         25                21 - 32 mmol*       Anion gap                   8                 7 - 16 mmol/L       Glucose                     207 (H)           65 - 100 mg/*       BUN                         8                 6 - 23 MG/DL        Creatinine                  1.24              0.8 - 1.5 MG*       GFR est AA                  >60               >60 ml/min/1*       GFR est non-AA              >60               >60 ml/min/1*       Calcium                     9.1               8.3 - 10.4 M*       Bilirubin, total            0.6               0.2 - 1.1 MG*       ALT (SGPT)                  36                12 - 65 U/L         AST (SGOT)                  39 (H)            15 - 37 U/L         Alk.  phosphatase            121               50 - 136 U/L        Protein, total              8.9 (H)           6.3 - 8.2 g/*       Albumin                     3.8               3.5 - 5.0 g/*       Globulin                    5.1 (H)           2.3 - 3.5 g/*       A-G Ratio 0.7 (L)           1.2 - 3.5      -URINE MICROSCOPIC       Result                      Value             Ref Range           WBC                         0-3               0 /hpf              RBC                         0-3               0 /hpf              Epithelial cells            0-3               0 /hpf              Bacteria                    0                 0 /hpf              Casts                       3-5               0 /lpf         -TROPONIN-HIGH SENSITIVITY       Result                      Value             Ref Range           Troponin-High Sensitiv*     33.7 (H)          0 - 14 pg/mL   -EKG, 12 LEAD, INITIAL       Result                      Value             Ref Range           Ventricular Rate            132               BPM                 Atrial Rate                 132               BPM                 P-R Interval                138               ms                  QRS Duration                82                ms                  Q-T Interval                310               ms                  QTC Calculation (Bezet)     459               ms                  Calculated P Axis           60                degrees             Calculated R Axis           29                degrees             Calculated T Axis           47                degrees             Diagnosis                                                     Sinus tachycardia   When compared with ECG of 28-APR-2021 09:07,   No significant change was found   Heart rates are faster   Confirmed by Hussein Wood MD, Marija Barragan ([id number]) on 8/18/2021 12:54:53 PM     )  Tests in the radiology section of CPT®: ordered (XR CHEST PORT    Result Date: 8/18/2021  CHEST X-RAY, one view. HISTORY:  COVID positive with body aches, cough and shortness of breath TECHNIQUE:  AP upright portable view. COMPARISON: April 2021. FINDINGS: Lungs: are clear. Costophrenic angles: are sharp. Heart size: is normal. Pulmonary vasculature: is unremarkable.  Aorta: Unremarkable. Included portion of the upper abdomen: is unremarkable. Bones: No gross bony lesions. Other: None. Negative for acute change. CT CHEST PULMONARY EMBOLISM    Result Date: 8/18/2021  CT OF THE CHEST WITH INTRAVENOUS CONTRAST. INDICATION: COVID positive, shortness breath and body aches. COMPARISON: None. TECHNIQUE:   2.5 mm axial scans from above the aortic arch to the lung bases with 100 cc nonionic intravenous contrast without acute complication. Intravenous contrast was given to evaluate for pulmonary embolism. Radiation dose reduction techniques were used for this study. Our CT scanners use one or more of the following:  Automated exposure control, adjustment of the mA and or kV according to patient size, iterative reconstruction. FINDINGS:  PULMONARY ARTERY OPACIFICATION: Adequate  NO intraluminal filling defects within the pulmonary arterial tree to suggest pulmonary embolism. LUNGS: Multiple scattered bilateral peripheral groundglass opacities. AIRWAYS: Trachea and proximal bronchi grossly patent. PLEURA: Minimal pleural thickening. LYMPH NODES: No enlarged axillary, hilar or mediastinal lymph nodes. HEART: Normal size. CORONARIES: Mild  calcifications. AORTA is normal caliber with a uniform lumen without evidence of dissection. UPPER ABDOMEN: Normal size adrenal glands. SKELETAL/CHEST WALL: DJD, otherwise no gross bony lesions. COVID pneumonia. Negative for pulmonary embolism.     )  Tests in the medicine section of CPT®: ordered and reviewed  Decide to obtain previous medical records or to obtain history from someone other than the patient: yes      ED Course as of Aug 18 1546   Wed Aug 18, 2021   1316 Added a troponin due to persistent tachycardia. [JS]   6586 Patient seems to have chronically elevated troponin at 33 but we will get a CT to rule out PE given persistent tachycardia.     [JS]      ED Course User Index  [JS] Agnes Bateman MD       Procedures

## 2021-08-19 ENCOUNTER — PATIENT OUTREACH (OUTPATIENT)
Dept: OTHER | Age: 46
End: 2021-08-19

## 2021-08-19 NOTE — PROGRESS NOTES
Patient on report as eligible for Case Management. Left discreet message on voicemail with this CM contact information. Will attempt to contact again to offer [phone] Management services.

## 2021-08-23 LAB
BACTERIA SPEC CULT: NORMAL
BACTERIA SPEC CULT: NORMAL
SERVICE CMNT-IMP: NORMAL
SERVICE CMNT-IMP: NORMAL

## 2021-08-24 ENCOUNTER — PATIENT OUTREACH (OUTPATIENT)
Dept: OTHER | Age: 46
End: 2021-08-24

## 2021-08-24 NOTE — LETTER
8/24/2021 11:40 AM    Mr. Eva Gayle  [address] [facility] [address]        Dear Yoselin Jenny Ricardo,    My name is Mindi Herbert, Associate Care Manager for [facility] and I have been trying to reach you. The Associate Care Management (ACM) program is a free-of-charge confidential service provided to our associates and their family members covered by the [facility]. The program will provide an associate and his/her family with the [facility] expertise to assist in navigating the health care delivery system, provider services, and their overall care needsso as to assure and improve health care interactions and enhance the quality of life. This program is designed to provide you with the opportunity to have a [employer] partner with you for the following services:     1) when you come home from the hospital or emergency room   2) when help is needed to manage your disease   3) when you need assistance coordinating services or appointments  4) when you need additional education, resources or assistance reaching your Be Well Health Program goals/requirements such as Be Well With Diabetes      [facility] is dedicated to empowering the good health of its community and improving the quality of care and care experiences for associates and their families. We are committed to safeguarding patient confidentiality and privacy, assuring that every associate has the respect he or she deserves in managing their health. The information shared with your care manager will not be shared with anyone else aside from those you identify as part of your care team, and will only be used to assist you with any identified care needs. Please contact me if you would like this service provided to you.      Sincerely,  Mindi Herbert, PRAKASH    [address] [address]  [facility] [phone] Fax [email or web address]

## 2021-08-24 NOTE — PROGRESS NOTES
Patient identified as eligible for [address] [place] [address] services. Second telephone outreach attempted. Left discreet voicemail with this CM confidential contact information. Will send UTR letter.

## 2021-09-15 ENCOUNTER — PATIENT OUTREACH (OUTPATIENT)
Dept: OTHER | Age: 46
End: 2021-09-15

## 2021-09-15 NOTE — PROGRESS NOTES
Verified  and address for HIPAA security. Introduced [employer] for patient. Patient does not identify any Care Management needs at this time and declines services. Patient encouraged to keep this ECM contact information close and reach out if any questions arise or care management services are needed.

## 2021-10-10 ENCOUNTER — HOSPITAL ENCOUNTER (EMERGENCY)
Age: 46
Discharge: HOME | End: 2021-10-11
Attending: EMERGENCY MEDICINE

## 2021-10-10 DIAGNOSIS — R31.9 HEMATURIA, UNSPECIFIED TYPE: Primary | ICD-10-CM

## 2021-10-10 LAB
BACTERIA URNS QL MICRO: 0 /HPF
CASTS URNS QL MICRO: 0 /LPF
CRYSTALS URNS QL MICRO: 0 /LPF
EPI CELLS #/AREA URNS HPF: 0 /HPF
MUCOUS THREADS URNS QL MICRO: 0 /LPF
RBC #/AREA URNS HPF: >100 /HPF
WBC URNS QL MICRO: NORMAL /HPF

## 2021-10-10 PROCEDURE — 87088 URINE BACTERIA CULTURE: CPT

## 2021-10-10 PROCEDURE — 99283 EMERGENCY DEPT VISIT LOW MDM: CPT

## 2021-10-10 PROCEDURE — 81015 MICROSCOPIC EXAM OF URINE: CPT

## 2021-10-10 PROCEDURE — 87186 SC STD MICRODIL/AGAR DIL: CPT

## 2021-10-10 PROCEDURE — 87086 URINE CULTURE/COLONY COUNT: CPT

## 2021-10-11 ENCOUNTER — APPOINTMENT (OUTPATIENT)
Dept: CT IMAGING | Age: 46
End: 2021-10-11
Attending: EMERGENCY MEDICINE

## 2021-10-11 VITALS
DIASTOLIC BLOOD PRESSURE: 102 MMHG | HEART RATE: 127 BPM | SYSTOLIC BLOOD PRESSURE: 170 MMHG | WEIGHT: 285.06 LBS | OXYGEN SATURATION: 97 % | RESPIRATION RATE: 16 BRPM | BODY MASS INDEX: 35.44 KG/M2 | TEMPERATURE: 100 F | HEIGHT: 75 IN

## 2021-10-11 LAB
ALBUMIN SERPL-MCNC: 3.7 G/DL (ref 3.5–5)
ALBUMIN/GLOB SERPL: 0.9 {RATIO} (ref 1.2–3.5)
ALP SERPL-CCNC: 123 U/L (ref 50–136)
ALT SERPL-CCNC: 32 U/L (ref 12–65)
ANION GAP SERPL CALC-SCNC: 6 MMOL/L (ref 7–16)
AST SERPL-CCNC: 15 U/L (ref 15–37)
BASOPHILS # BLD: 0.1 K/UL (ref 0–0.2)
BASOPHILS NFR BLD: 1 % (ref 0–2)
BILIRUB SERPL-MCNC: 0.4 MG/DL (ref 0.2–1.1)
BUN SERPL-MCNC: 12 MG/DL (ref 6–23)
CALCIUM SERPL-MCNC: 9.9 MG/DL (ref 8.3–10.4)
CHLORIDE SERPL-SCNC: 105 MMOL/L (ref 98–107)
CO2 SERPL-SCNC: 27 MMOL/L (ref 21–32)
CREAT SERPL-MCNC: 1.26 MG/DL (ref 0.8–1.5)
DIFFERENTIAL METHOD BLD: ABNORMAL
EOSINOPHIL # BLD: 0.1 K/UL (ref 0–0.8)
EOSINOPHIL NFR BLD: 1 % (ref 0.5–7.8)
ERYTHROCYTE [DISTWIDTH] IN BLOOD BY AUTOMATED COUNT: 13.5 % (ref 11.9–14.6)
GLOBULIN SER CALC-MCNC: 4.1 G/DL (ref 2.3–3.5)
GLUCOSE SERPL-MCNC: 279 MG/DL (ref 65–100)
HCT VFR BLD AUTO: 39.2 % (ref 41.1–50.3)
HGB BLD-MCNC: 13.4 G/DL (ref 13.6–17.2)
IMM GRANULOCYTES # BLD AUTO: 0 K/UL (ref 0–0.5)
IMM GRANULOCYTES NFR BLD AUTO: 0 % (ref 0–5)
LYMPHOCYTES # BLD: 3.6 K/UL (ref 0.5–4.6)
LYMPHOCYTES NFR BLD: 34 % (ref 13–44)
MCH RBC QN AUTO: 28.2 PG (ref 26.1–32.9)
MCHC RBC AUTO-ENTMCNC: 34.2 G/DL (ref 31.4–35)
MCV RBC AUTO: 82.4 FL (ref 79.6–97.8)
MONOCYTES # BLD: 0.7 K/UL (ref 0.1–1.3)
MONOCYTES NFR BLD: 7 % (ref 4–12)
NEUTS SEG # BLD: 6 K/UL (ref 1.7–8.2)
NEUTS SEG NFR BLD: 57 % (ref 43–78)
NRBC # BLD: 0 K/UL (ref 0–0.2)
PLATELET # BLD AUTO: 240 K/UL (ref 150–450)
PMV BLD AUTO: 10.7 FL (ref 9.4–12.3)
POTASSIUM SERPL-SCNC: 4 MMOL/L (ref 3.5–5.1)
PROT SERPL-MCNC: 7.8 G/DL (ref 6.3–8.2)
RBC # BLD AUTO: 4.76 M/UL (ref 4.23–5.6)
SODIUM SERPL-SCNC: 138 MMOL/L (ref 136–145)
WBC # BLD AUTO: 10.5 K/UL (ref 4.3–11.1)

## 2021-10-11 PROCEDURE — 74176 CT ABD & PELVIS W/O CONTRAST: CPT

## 2021-10-11 PROCEDURE — 85025 COMPLETE CBC W/AUTO DIFF WBC: CPT

## 2021-10-11 PROCEDURE — 80053 COMPREHEN METABOLIC PANEL: CPT

## 2021-10-11 RX ORDER — CEFDINIR 300 MG/1
300 CAPSULE ORAL 2 TIMES DAILY
Qty: 10 CAPSULE | Refills: 0 | Status: SHIPPED | OUTPATIENT
Start: 2021-10-11 | End: 2021-10-14

## 2021-10-11 NOTE — ED PROVIDER NOTES
81-year-old male with history of diabetes, gout, hypertension presents with sudden onset burning with urination and urinary frequency tonight while eating dinner. Just developed blood in urine with clots on last urination. No similar symptoms in the past.  Denies abdominal pain or back pain. No nausea, vomiting, fever, history of stones, new sexual partners, penile discharge, testicle pain or swelling. No history of prostate problems. Has a family history of lung and colon cancer. Blood in Urine   Associated symptoms include hematuria, urgency and flank pain. Pertinent negatives include no nausea, no vomiting, no abdominal pain and no back pain.         Past Medical History:   Diagnosis Date    Diabetes ([facility]     Gout     Hypertension        Past Surgical History:   Procedure Laterality Date    HX APPENDECTOMY           Family History:   Problem Relation Age of Onset    Cancer Mother 61        colon cance    Diabetes Mother     Hypertension Mother     Diabetes Sister     Hypertension Sister     Hypertension Brother     Diabetes Brother        Social History     Socioeconomic History    Marital status:      Spouse name: Not on file    Number of children: Not on file    Years of education: Not on file    Highest education level: Not on file   Occupational History    Occupation: manage psychiatric facility   Tobacco Use    Smoking status: Never Smoker    Smokeless tobacco: Never Used   Vaping Use    Vaping Use: Never used   Substance and Sexual Activity    Alcohol use: Not Currently    Drug use: Not Currently    Sexual activity: Not on file   Other Topics Concern    Not on file   Social History Narrative    Not on file     Social Determinants of Health     Financial Resource Strain:     Difficulty of Paying Living Expenses:    Food Insecurity:     Worried About Running Out of Food in the Last Year:     [address] in the Last Year:    Transportation Needs:     Lack of Transportation (Medical):  Lack of Transportation (Non-Medical):    Physical Activity:     Days of Exercise per Week:     Minutes of Exercise per Session:    Stress:     Feeling of Stress :    Social Connections:     Frequency of Communication with Friends and Family:     Frequency of Social Gatherings with Friends and Family:     Attends [employer] Services:     Active Member of Clubs or Organizations:     Attends Club or Organization Meetings:     Marital Status:    Intimate Partner Violence:     Fear of Current or Ex-Partner:     Emotionally Abused:     Physically Abused:     Sexually Abused: ALLERGIES: Tamiflu [oseltamivir] and Tramadol    Review of Systems   Constitutional: Negative for fever. HENT: Negative for hearing loss. Eyes: Negative for visual disturbance. Respiratory: Negative for cough and shortness of breath. Cardiovascular: Negative for chest pain. Gastrointestinal: Negative for abdominal pain, diarrhea, nausea and vomiting. Genitourinary: Positive for dysuria, flank pain, hematuria and urgency. Negative for discharge, scrotal swelling and testicular pain. Musculoskeletal: Negative for back pain. Skin: Negative for rash. Neurological: Negative for headaches. Psychiatric/Behavioral: Negative for confusion. All other systems reviewed and are negative. Vitals:    10/10/21 2334   BP: (!) 167/118   Pulse: (!) 127   Resp: 16   Temp: 100 °F (37.8 °C)   SpO2: 98%   Weight: 129.3 kg (285 lb 0.9 oz)   Height: 6' 3\" (1.905 m)            Physical Exam  Vitals and nursing note reviewed. Constitutional:       Appearance: Normal appearance. He is well-developed. HENT:      Head: Normocephalic and atraumatic. Nose: Nose normal.      Mouth/Throat:      Mouth: Mucous membranes are moist.   Eyes:      Pupils: Pupils are equal, round, and reactive to light. Cardiovascular:      Rate and Rhythm: Regular rhythm. Tachycardia present.       Heart sounds: Normal heart sounds. Pulmonary:      Effort: Pulmonary effort is normal.      Breath sounds: Normal breath sounds. Abdominal:      Palpations: Abdomen is soft. Tenderness: There is no abdominal tenderness. There is no right CVA tenderness or left CVA tenderness. Musculoskeletal:         General: No deformity. Normal range of motion. Cervical back: Normal range of motion and neck supple. Skin:     General: Skin is warm and dry. Neurological:      General: No focal deficit present. Mental Status: He is alert. Mental status is at baseline. Psychiatric:         Mood and Affect: Mood normal.         Behavior: Behavior normal.          MDM  Number of Diagnoses or Management Options  Diagnosis management comments: Parts of this document were created using dragon voice recognition software. The chart has been reviewed but errors may still be present. I wore appropriate PPE throughout this patient's ED visit. Alise Solorzano MD, 12:38 AM    2:06 AM  No obvious stone. Will place on antibiotics. Urine sent for culture. Given urology follow up. Discussed concern for cancer due to lymph node enlargement. I discussed the results of all labs, procedures, radiographs, and treatments with the patient and available family. Treatment plan is agreed upon and the patient is ready for discharge. Questions about treatment in the ED and differential diagnosis of presenting condition were answered. Patient was given verbal discharge instructions including, but not limited to, importance of returning to the emergency department for any concern of worsening or continued symptoms. Instructions were given to follow up with a primary care provider or specialist within 1-2 days.   Adverse effects of medications, if prescribed, were discussed and patient was advised to refrain from significant physical activity until followed up by primary care physician and to not drive or operate heavy machinery after taking any sedating substances.                Amount and/or Complexity of Data Reviewed  Clinical lab tests: ordered and reviewed (Results for orders placed or performed during the hospital encounter of 10/10/21  -URINE MICROSCOPIC       Result                      Value             Ref Range           WBC                         5-10              0 /hpf              RBC                         >100              0 /hpf              Epithelial cells            0                 0 /hpf              Bacteria                    0                 0 /hpf              Casts                       0                 0 /lpf              Crystals, urine             0                 0 /LPF              Mucus                       0                 0 /lpf         -CBC WITH AUTOMATED DIFF       Result                      Value             Ref Range           WBC                         10.5              4.3 - 11.1 K*       RBC                         4.76              4.23 - 5.6 M*       HGB                         13.4 (L)          13.6 - 17.2 *       HCT                         39.2 (L)          41.1 - 50.3 %       MCV                         82.4              79.6 - 97.8 *       MCH                         28.2              26.1 - 32.9 *       MCHC                        34.2              31.4 - 35.0 *       RDW                         13.5              11.9 - 14.6 %       PLATELET                    240               150 - 450 K/*       MPV                         10.7              9.4 - 12.3 FL       ABSOLUTE NRBC               0.00              0.0 - 0.2 K/*       DF                          AUTOMATED                             NEUTROPHILS                 57                43 - 78 %           LYMPHOCYTES                 34                13 - 44 %           MONOCYTES                   7                 4.0 - 12.0 %        EOSINOPHILS                 1                 0.5 - 7.8 %         BASOPHILS                   1 0.0 - 2.0 %         IMMATURE GRANULOCYTES       0                 0.0 - 5.0 %         ABS. NEUTROPHILS            6.0               1.7 - 8.2 K/*       ABS. LYMPHOCYTES            3.6               0.5 - 4.6 K/*       ABS. MONOCYTES              0.7               0.1 - 1.3 K/*       ABS. EOSINOPHILS            0.1               0.0 - 0.8 K/*       ABS. BASOPHILS              0.1               0.0 - 0.2 K/*       ABS. IMM. GRANS.            0.0               0.0 - 0.5 K/*  -METABOLIC PANEL, COMPREHENSIVE       Result                      Value             Ref Range           Sodium                      138               136 - 145 mm*       Potassium                   4.0               3.5 - 5.1 mm*       Chloride                    105               98 - 107 mmo*       CO2                         27                21 - 32 mmol*       Anion gap                   6 (L)             7 - 16 mmol/L       Glucose                     279 (H)           65 - 100 mg/*       BUN                         12                6 - 23 MG/DL        Creatinine                  1.26              0.8 - 1.5 MG*       GFR est AA                  >60               >60 ml/min/1*       GFR est non-AA              >60               >60 ml/min/1*       Calcium                     9.9               8.3 - 10.4 M*       Bilirubin, total            0.4               0.2 - 1.1 MG*       ALT (SGPT)                  32                12 - 65 U/L         AST (SGOT)                  15                15 - 37 U/L         Alk.  phosphatase            123               50 - 136 U/L        Protein, total              7.8               6.3 - 8.2 g/*       Albumin                     3.7               3.5 - 5.0 g/*       Globulin                    4.1 (H)           2.3 - 3.5 g/*       A-G Ratio                   0.9 (L)           1.2 - 3.5      )  Tests in the radiology section of CPT®: ordered and reviewed (CT ABD/PEL FOR RENAL STONE    Result Date: 10/11/2021  EXAM: CT ABD/PEL FOR RENAL STONE HISTORY: . TECHNIQUE: Axial images of the abdomen and pelvis were performed without intravenous contrast. Images were obtained in the axial plane and coronal reformatted images were created and reviewed. Dose reduction technique used: Automated exposure control/Adjustment of the mA and/or kV according to patient size/Use of iterative reconstruction technique. COMPARISON: CT chest dated 8/18/2021 FINDINGS: Visualized lung bases and mediastinum are unremarkable. The visualized liver, spleen, pancreas, adrenal glands, are within normal limits. The gallbladder contains a radiopaque stone. Otherwise it appears grossly normal. The kidneys are unremarkable. There is no hydronephrosis. There are tiny calcifications in the left kidney with no evidence of obstruction. The bladder is partially distended appears grossly normal. Distal esophagus and stomach are unremarkable. Small and large bowel are without evidence of obstruction. The patient is status post appendectomy. No evidence of free fluid, free air, focal fluid collection. No abdominal aortic aneurysm. Incidental note is made of multiple small retroperitoneal lymph nodes. Osseous structures are without evidence of acute fracture or suspicious lesion. There are a few tiny calcifications in the left kidney. There is no evidence of obstruction. There is no hydronephrosis on either side. The bladder is partially distended and appears grossly unremarkable as seen. Incidental note is made of multiple tiny intraperitoneal lymph nodes. The significance of this is unclear.  Other findings as above.     )           Procedures

## 2021-10-11 NOTE — DISCHARGE INSTRUCTIONS
You must follow-up with urology for further evaluation and testing. Return for worsening or concerning symptoms. Take all antibiotic. Follow-up culture results with your doctor. CT ABD/PEL FOR RENAL STONE    Result Date: 10/11/2021  EXAM: CT ABD/PEL FOR RENAL STONE HISTORY: . TECHNIQUE: Axial images of the abdomen and pelvis were performed without intravenous contrast. Images were obtained in the axial plane and coronal reformatted images were created and reviewed. Dose reduction technique used: Automated exposure control/Adjustment of the mA and/or kV according to patient size/Use of iterative reconstruction technique. COMPARISON: CT chest dated 8/18/2021 FINDINGS: Visualized lung bases and mediastinum are unremarkable. The visualized liver, spleen, pancreas, adrenal glands, are within normal limits. The gallbladder contains a radiopaque stone. Otherwise it appears grossly normal. The kidneys are unremarkable. There is no hydronephrosis. There are tiny calcifications in the left kidney with no evidence of obstruction. The bladder is partially distended appears grossly normal. Distal esophagus and stomach are unremarkable. Small and large bowel are without evidence of obstruction. The patient is status post appendectomy. No evidence of free fluid, free air, focal fluid collection. No abdominal aortic aneurysm. Incidental note is made of multiple small retroperitoneal lymph nodes. Osseous structures are without evidence of acute fracture or suspicious lesion. There are a few tiny calcifications in the left kidney. There is no evidence of obstruction. There is no hydronephrosis on either side. The bladder is partially distended and appears grossly unremarkable as seen. Incidental note is made of multiple tiny intraperitoneal lymph nodes. The significance of this is unclear. Other findings as above.       Results for orders placed or performed during the hospital encounter of 10/10/21   URINE MICROSCOPIC Result Value Ref Range    WBC 5-10 0 /hpf    RBC >100 0 /hpf    Epithelial cells 0 0 /hpf    Bacteria 0 0 /hpf    Casts 0 0 /lpf    Crystals, urine 0 0 /LPF    Mucus 0 0 /lpf   CBC WITH AUTOMATED DIFF   Result Value Ref Range    WBC 10.5 4.3 - 11.1 K/uL    RBC 4.76 4.23 - 5.6 M/uL    HGB 13.4 (L) 13.6 - 17.2 g/dL    HCT 39.2 (L) 41.1 - 50.3 %    MCV 82.4 79.6 - 97.8 FL    MCH 28.2 26.1 - 32.9 PG    MCHC 34.2 31.4 - 35.0 g/dL    RDW 13.5 11.9 - 14.6 %    PLATELET 984 156 - 919 K/uL    MPV 10.7 9.4 - 12.3 FL    ABSOLUTE NRBC 0.00 0.0 - 0.2 K/uL    DF AUTOMATED      NEUTROPHILS 57 43 - 78 %    LYMPHOCYTES 34 13 - 44 %    MONOCYTES 7 4.0 - 12.0 %    EOSINOPHILS 1 0.5 - 7.8 %    BASOPHILS 1 0.0 - 2.0 %    IMMATURE GRANULOCYTES 0 0.0 - 5.0 %    ABS. NEUTROPHILS 6.0 1.7 - 8.2 K/UL    ABS. LYMPHOCYTES 3.6 0.5 - 4.6 K/UL    ABS. MONOCYTES 0.7 0.1 - 1.3 K/UL    ABS. EOSINOPHILS 0.1 0.0 - 0.8 K/UL    ABS. BASOPHILS 0.1 0.0 - 0.2 K/UL    ABS. IMM. GRANS. 0.0 0.0 - 0.5 K/UL   METABOLIC PANEL, COMPREHENSIVE   Result Value Ref Range    Sodium 138 136 - 145 mmol/L    Potassium 4.0 3.5 - 5.1 mmol/L    Chloride 105 98 - 107 mmol/L    CO2 27 21 - 32 mmol/L    Anion gap 6 (L) 7 - 16 mmol/L    Glucose 279 (H) 65 - 100 mg/dL    BUN 12 6 - 23 MG/DL    Creatinine 1.26 0.8 - 1.5 MG/DL    GFR est AA >60 >60 ml/min/1.73m2    GFR est non-AA >60 >60 ml/min/1.73m2    Calcium 9.9 8.3 - 10.4 MG/DL    Bilirubin, total 0.4 0.2 - 1.1 MG/DL    ALT (SGPT) 32 12 - 65 U/L    AST (SGOT) 15 15 - 37 U/L    Alk.  phosphatase 123 50 - 136 U/L    Protein, total 7.8 6.3 - 8.2 g/dL    Albumin 3.7 3.5 - 5.0 g/dL    Globulin 4.1 (H) 2.3 - 3.5 g/dL    A-G Ratio 0.9 (L) 1.2 - 3.5

## 2021-10-11 NOTE — ED NOTES
I have reviewed discharge instructions with the patient. The patient verbalized understanding. Patient left ED via Discharge Method: ambulatory to Home with wife. Opportunity for questions and clarification provided. Patient given 1 scripts. To continue your aftercare when you leave the hospital, you may receive an automated call from our care team to check in on how you are doing. This is a free service and part of our promise to provide the best care and service to meet your aftercare needs.  If you have questions, or wish to unsubscribe from this service please call [phone]. Thank you for Choosing our [facility] Emergency Department.

## 2021-10-11 NOTE — ED TRIAGE NOTES
Pt presents to the ED for c/o hematuria and painful urination. Sudden onset today.   Denies any other discomfort

## 2021-10-12 ENCOUNTER — PATIENT OUTREACH (OUTPATIENT)
Dept: OTHER | Age: 46
End: 2021-10-12

## 2021-10-12 NOTE — PROGRESS NOTES
Patient on report as eligible for Case Management. Left discreet message on voicemail with this CM contact information. Will attempt to contact again to offer [phone] Management services. Patient is 40 yo male seen in ER at [facility] on 10/11/21 for complaints of sudden onset dysuria with hematuria. Work up in [facility] including [address] and labs. UA: 5 to 10 WBC and > than 100 RBC  Culture positive for 50,000 to 100,000 Colonies/ml Gram Negative Rods  Sensitivity pending   CT Impression: There are a few tiny calcifications in the left kidney. There is no evidence of  obstruction. There is no hydronephrosis on either side.   The bladder is partially distended and appears grossly unremarkable as seen.   Incidental note is made of multiple tiny intraperitoneal lymph nodes. The significance of this is unclear. Patient was discharged home with Rx for STACY CHERRY and advised close follow up with Urologist.  Per chart review patient is scheduled for new patient appointment with Urologist on 10/14/21.

## 2021-10-13 ENCOUNTER — PATIENT OUTREACH (OUTPATIENT)
Dept: OTHER | Age: 46
End: 2021-10-13

## 2021-10-13 LAB
BACTERIA SPEC CULT: ABNORMAL
SERVICE CMNT-IMP: ABNORMAL

## 2021-10-13 NOTE — PROGRESS NOTES
ED pharmacy student reviewed recent results of urine culture. The patient received a prescription for cefdinir 300 mg PO BID x5 days upon discharge. Based on culture results, the patient is being adequately treated for the identified infection with existing antimicrobial therapy. No further intervention needed.     Allergies as of 10/10/2021 - Fully Reviewed 10/10/2021   Allergen Reaction Noted    Tamiflu [oseltamivir] Other (comments) 07/22/2018    Tramadol Unknown (comments) 07/22/2018     Rey Hendrix, PharmD Candidate    Albert CastleD  Emergency Medicine Clinical Pharmacist

## 2021-10-13 NOTE — LETTER
Mr. Odalys Arthur  [facility] [address]  [place] [address]    Dear Aye Luevano,  My name is Andrea Nunez LPN, Associate Care Manager for [employer] and I have been trying to reach you, for follow up after your recent ER visit. The Associate Care Management (ACM) program is a free-of-charge confidential service provided to our associates and their family members covered by the [facility]. The program will provide an associate and his/her family with the [facility] expertise to assist in navigating the health care delivery system, provider services, and their overall care needsso as to assure and improve health care interactions and enhance the quality of life. This program is designed to provide you with the opportunity to have a [employer] partner with you for the following services:     1) when you come home from the hospital or emergency room   2) when help is needed to manage your disease   3) when you need assistance coordinating services or appointments  4) when you need additional education, resources or assistance reaching your Be Well Health Program goals/requirements such as Be Well With Diabetes      [facility] is dedicated to empowering the good health of its community and improving the quality of care and care experiences for associates and their families. We are committed to safeguarding patient confidentiality and privacy, assuring that every associate has the respect he or she deserves in managing their health. The information shared with your care manager will not be shared with anyone else aside from those you identify as part of your care team, and will only be used to assist you with any identified care needs. Please contact me if you would like this service provided to you.      Sincerely,  Andrea Nunez LPN  [address]  [address] [phone]  [phone]  [email or web address]  Hernesto STROUD [email or web address]. [email or web address]

## 2021-10-13 NOTE — PROGRESS NOTES
Patient identified as eligible for [address] [place] [address] services. Second telephone outreach attempted. Left discreet voicemail with this CM confidential contact information. Will send UTR letter.

## 2021-11-12 ENCOUNTER — PATIENT OUTREACH (OUTPATIENT)
Dept: OTHER | Age: 46
End: 2021-11-12